# Patient Record
Sex: FEMALE | Race: WHITE | NOT HISPANIC OR LATINO | Employment: OTHER | URBAN - METROPOLITAN AREA
[De-identification: names, ages, dates, MRNs, and addresses within clinical notes are randomized per-mention and may not be internally consistent; named-entity substitution may affect disease eponyms.]

---

## 2023-03-09 ENCOUNTER — OFFICE VISIT (OUTPATIENT)
Dept: INTERNAL MEDICINE | Facility: CLINIC | Age: 63
End: 2023-03-09
Payer: COMMERCIAL

## 2023-03-09 VITALS
WEIGHT: 158 LBS | HEART RATE: 62 BPM | DIASTOLIC BLOOD PRESSURE: 64 MMHG | TEMPERATURE: 98 F | SYSTOLIC BLOOD PRESSURE: 110 MMHG | RESPIRATION RATE: 14 BRPM | HEIGHT: 66 IN | BODY MASS INDEX: 25.39 KG/M2 | OXYGEN SATURATION: 98 %

## 2023-03-09 DIAGNOSIS — Z01.419 WELL WOMAN EXAM: ICD-10-CM

## 2023-03-09 DIAGNOSIS — Z13.6 ENCOUNTER FOR SCREENING FOR CARDIOVASCULAR DISORDERS: ICD-10-CM

## 2023-03-09 DIAGNOSIS — E55.9 VITAMIN D DEFICIENCY: ICD-10-CM

## 2023-03-09 DIAGNOSIS — R21 SKIN RASH: ICD-10-CM

## 2023-03-09 DIAGNOSIS — Z12.31 VISIT FOR SCREENING MAMMOGRAM: ICD-10-CM

## 2023-03-09 DIAGNOSIS — Z00.00 WELLNESS EXAMINATION: Primary | ICD-10-CM

## 2023-03-09 PROCEDURE — 1160F RVW MEDS BY RX/DR IN RCRD: CPT | Mod: CPTII,,, | Performed by: INTERNAL MEDICINE

## 2023-03-09 PROCEDURE — 3074F PR MOST RECENT SYSTOLIC BLOOD PRESSURE < 130 MM HG: ICD-10-PCS | Mod: CPTII,,, | Performed by: INTERNAL MEDICINE

## 2023-03-09 PROCEDURE — 99386 PREV VISIT NEW AGE 40-64: CPT | Mod: ,,, | Performed by: INTERNAL MEDICINE

## 2023-03-09 PROCEDURE — 3074F SYST BP LT 130 MM HG: CPT | Mod: CPTII,,, | Performed by: INTERNAL MEDICINE

## 2023-03-09 PROCEDURE — 3008F PR BODY MASS INDEX (BMI) DOCUMENTED: ICD-10-PCS | Mod: CPTII,,, | Performed by: INTERNAL MEDICINE

## 2023-03-09 PROCEDURE — 1159F MED LIST DOCD IN RCRD: CPT | Mod: CPTII,,, | Performed by: INTERNAL MEDICINE

## 2023-03-09 PROCEDURE — 1160F PR REVIEW ALL MEDS BY PRESCRIBER/CLIN PHARMACIST DOCUMENTED: ICD-10-PCS | Mod: CPTII,,, | Performed by: INTERNAL MEDICINE

## 2023-03-09 PROCEDURE — 3078F DIAST BP <80 MM HG: CPT | Mod: CPTII,,, | Performed by: INTERNAL MEDICINE

## 2023-03-09 PROCEDURE — 3078F PR MOST RECENT DIASTOLIC BLOOD PRESSURE < 80 MM HG: ICD-10-PCS | Mod: CPTII,,, | Performed by: INTERNAL MEDICINE

## 2023-03-09 PROCEDURE — 36415 COLL VENOUS BLD VENIPUNCTURE: CPT | Mod: ,,, | Performed by: INTERNAL MEDICINE

## 2023-03-09 PROCEDURE — 36415 PR COLLECTION VENOUS BLOOD,VENIPUNCTURE: ICD-10-PCS | Mod: ,,, | Performed by: INTERNAL MEDICINE

## 2023-03-09 PROCEDURE — 99386 PR PREVENTIVE VISIT,NEW,40-64: ICD-10-PCS | Mod: ,,, | Performed by: INTERNAL MEDICINE

## 2023-03-09 PROCEDURE — 3008F BODY MASS INDEX DOCD: CPT | Mod: CPTII,,, | Performed by: INTERNAL MEDICINE

## 2023-03-09 PROCEDURE — 1159F PR MEDICATION LIST DOCUMENTED IN MEDICAL RECORD: ICD-10-PCS | Mod: CPTII,,, | Performed by: INTERNAL MEDICINE

## 2023-03-09 RX ORDER — CHOLECALCIFEROL (VITAMIN D3) 25 MCG
TABLET ORAL DAILY
COMMUNITY

## 2023-03-09 RX ORDER — BUTYROSPERMUM PARKII(SHEA BUTTER), SIMMONDSIA CHINENSIS (JOJOBA) SEED OIL, ALOE BARBADENSIS LEAF EXTRACT .01; 1; 3.5 G/100G; G/100G; G/100G
LIQUID TOPICAL
COMMUNITY

## 2023-03-09 RX ORDER — AMOXICILLIN 500 MG
CAPSULE ORAL DAILY
COMMUNITY

## 2023-03-09 NOTE — PROGRESS NOTES
"Subjective:       Patient ID: Shira Ying is a 62 y.o. female.    Chief Complaint: [facility] [place] [facility]    62-year-old white female is here for initial visit to [facility] and for wellness exam.  She recently moved here from [place] with her .  She is not on any prescription medication.  She has a history of chronic constipation and mild anxiety, but has never been on anxiety medication.  She denies chest pain or shortness of breath.  Review of systems otherwise negative.    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  Her  is here today as a new patient.  She has 2 children from a previous marriage.  Her brother-in-law is Malcolm Davison, and they live in [place].  She enjoys hiking    Review of Systems   Constitutional:  Negative for fever.   HENT:  Negative for nosebleeds.    Eyes:  Negative for visual disturbance.   Respiratory:  Negative for shortness of breath.    Cardiovascular:  Negative for chest pain.   Gastrointestinal:  Negative for abdominal pain.   Genitourinary:  Negative for dysuria.   Musculoskeletal:  Negative for gait problem.   Neurological:  Negative for headaches.       Objective:      Physical Exam  HENT:      Head: Normocephalic.      Mouth/Throat:      Pharynx: Oropharynx is clear.   Eyes:      Extraocular Movements: Extraocular movements intact.   Cardiovascular:      Rate and Rhythm: Normal rate and regular rhythm.   Pulmonary:      Breath sounds: Normal breath sounds.   Abdominal:      Palpations: Abdomen is soft.   Musculoskeletal:         General: No swelling.   Skin:     General: Skin is warm.   Neurological:      General: No focal deficit present.      Mental Status: She is alert and oriented to person, place, and time.   Psychiatric:         Mood and Affect: Mood normal.       Vitals:    03/09/23 0939   BP: 110/64   Pulse: 62   Resp: 14   Temp: 97.9 °F (36.6 °C)   SpO2: 98%   Weight: 71.7 kg (158 lb)   Height: 5' 6" (1.676 m)      Assessment:     "   Problem List Items Addressed This Visit          Endocrine    Vitamin D deficiency       Other    Wellness examination - Primary     Other Visit Diagnoses       Visit for screening mammogram        Relevant Orders    Mammo Digital Screening Bilat w/ Rob              Medication List with Changes/Refills   Current Medications    LACTOBACILLUS RHAMNOSUS GG (CULTURELLE) 10 BILLION CELL CAPSULE    Take 1 capsule by mouth once daily.    MAGNESIUM CITRATE 100 MG CAP    Take by mouth.    MULTIVITAMIN WITH MINERALS TABLET    Take 1 tablet by mouth once daily.    OMEGA-3 FATTY ACIDS/FISH OIL (FISH OIL-OMEGA-3 FATTY ACIDS) 300-1,000 MG CAPSULE    Take by mouth once daily.    VITAMIN D (VITAMIN D3) 1000 UNITS TAB    Take by mouth once daily.        Plan:       Chronic constipation:  Continue magnesium  Vitamin-D deficiency: Continue supplement  Wellness:  Check fasting labs today, schedule calcium score, schedule mammogram and bone density.  Get records from last colonoscopy, which was in the past 3 years she says.

## 2023-03-21 ENCOUNTER — HOSPITAL ENCOUNTER (OUTPATIENT)
Dept: RADIOLOGY | Facility: HOSPITAL | Age: 63
Discharge: HOME | End: 2023-03-21
Attending: INTERNAL MEDICINE
Payer: COMMERCIAL

## 2023-03-21 DIAGNOSIS — Z13.6 ENCOUNTER FOR SCREENING FOR CARDIOVASCULAR DISORDERS: ICD-10-CM

## 2023-03-21 DIAGNOSIS — Z12.31 VISIT FOR SCREENING MAMMOGRAM: ICD-10-CM

## 2023-03-21 DIAGNOSIS — Z00.00 WELLNESS EXAMINATION: ICD-10-CM

## 2023-03-21 PROCEDURE — 77067 SCR MAMMO BI INCL CAD: CPT | Mod: TC

## 2023-03-21 PROCEDURE — 75571 CT HRT W/O DYE W/CA TEST: CPT | Mod: TC

## 2023-03-21 PROCEDURE — 77063 MAMMO DIGITAL SCREENING BILAT WITH TOMO: ICD-10-PCS | Mod: 26,,, | Performed by: RADIOLOGY

## 2023-03-21 PROCEDURE — 77080 DXA BONE DENSITY AXIAL: CPT | Mod: 26,,, | Performed by: RADIOLOGY

## 2023-03-21 PROCEDURE — 77080 DXA BONE DENSITY AXIAL SKELETON 1 OR MORE SITES: ICD-10-PCS | Mod: 26,,, | Performed by: RADIOLOGY

## 2023-03-21 PROCEDURE — 77067 MAMMO DIGITAL SCREENING BILAT WITH TOMO: ICD-10-PCS | Mod: 26,,, | Performed by: RADIOLOGY

## 2023-03-21 PROCEDURE — 77080 DXA BONE DENSITY AXIAL: CPT | Mod: TC

## 2023-03-21 PROCEDURE — 77063 BREAST TOMOSYNTHESIS BI: CPT | Mod: 26,,, | Performed by: RADIOLOGY

## 2023-03-21 PROCEDURE — 77067 SCR MAMMO BI INCL CAD: CPT | Mod: 26,,, | Performed by: RADIOLOGY

## 2023-03-22 ENCOUNTER — PATIENT MESSAGE (OUTPATIENT)
Dept: INTERNAL MEDICINE | Facility: CLINIC | Age: 63
End: 2023-03-22
Payer: COMMERCIAL

## 2023-05-23 ENCOUNTER — PATIENT MESSAGE (OUTPATIENT)
Dept: INTERNAL MEDICINE | Facility: CLINIC | Age: 63
End: 2023-05-23
Payer: COMMERCIAL

## 2023-06-07 ENCOUNTER — PATIENT MESSAGE (OUTPATIENT)
Dept: INTERNAL MEDICINE | Facility: CLINIC | Age: 63
End: 2023-06-07
Payer: COMMERCIAL

## 2023-06-08 ENCOUNTER — TELEPHONE (OUTPATIENT)
Dept: INTERNAL MEDICINE | Facility: CLINIC | Age: 63
End: 2023-06-08
Payer: COMMERCIAL

## 2023-06-12 PROBLEM — Z00.00 WELLNESS EXAMINATION: Status: RESOLVED | Noted: 2023-03-09 | Resolved: 2023-06-12

## 2023-06-14 ENCOUNTER — ANESTHESIA (OUTPATIENT)
Dept: SURGERY | Facility: HOSPITAL | Age: 63
End: 2023-06-14
Payer: COMMERCIAL

## 2023-06-14 ENCOUNTER — ANESTHESIA EVENT (OUTPATIENT)
Dept: SURGERY | Facility: HOSPITAL | Age: 63
End: 2023-06-14
Payer: COMMERCIAL

## 2023-06-14 ENCOUNTER — HOSPITAL ENCOUNTER (EMERGENCY)
Facility: HOSPITAL | Age: 63
Discharge: HOME | End: 2023-06-14
Attending: EMERGENCY MEDICINE | Admitting: SURGERY
Payer: COMMERCIAL

## 2023-06-14 VITALS
OXYGEN SATURATION: 94 % | WEIGHT: 158 LBS | HEART RATE: 69 BPM | TEMPERATURE: 98 F | HEIGHT: 66 IN | RESPIRATION RATE: 14 BRPM | DIASTOLIC BLOOD PRESSURE: 78 MMHG | SYSTOLIC BLOOD PRESSURE: 133 MMHG | BODY MASS INDEX: 25.39 KG/M2

## 2023-06-14 DIAGNOSIS — K37 APPENDICITIS, UNSPECIFIED APPENDICITIS TYPE: Primary | ICD-10-CM

## 2023-06-14 LAB
ALBUMIN SERPL-MCNC: 4.2 G/DL (ref 3.4–4.8)
ALBUMIN/GLOB SERPL: 1.4 RATIO (ref 1.1–2)
ALP SERPL-CCNC: 62 UNIT/L (ref 40–150)
ALT SERPL-CCNC: 20 UNIT/L (ref 0–55)
APPEARANCE UR: CLEAR
AST SERPL-CCNC: 25 UNIT/L (ref 5–34)
BACTERIA #/AREA URNS AUTO: NORMAL /HPF
BASOPHILS # BLD AUTO: 0.03 X10(3)/MCL
BASOPHILS NFR BLD AUTO: 0.2 %
BILIRUB UR QL STRIP.AUTO: NEGATIVE MG/DL
BILIRUBIN DIRECT+TOT PNL SERPL-MCNC: 1.7 MG/DL
BUN SERPL-MCNC: 13.6 MG/DL (ref 9.8–20.1)
CALCIUM SERPL-MCNC: 9.6 MG/DL (ref 8.4–10.2)
CHLORIDE SERPL-SCNC: 107 MMOL/L (ref 98–107)
CO2 SERPL-SCNC: 26 MMOL/L (ref 23–31)
COLOR UR: YELLOW
CREAT SERPL-MCNC: 0.98 MG/DL (ref 0.55–1.02)
EOSINOPHIL # BLD AUTO: 0.01 X10(3)/MCL (ref 0–0.9)
EOSINOPHIL NFR BLD AUTO: 0.1 %
ERYTHROCYTE [DISTWIDTH] IN BLOOD BY AUTOMATED COUNT: 13.5 % (ref 11.5–17)
GFR SERPLBLD CREATININE-BSD FMLA CKD-EPI: >60 MLS/MIN/1.73/M2
GLOBULIN SER-MCNC: 3 GM/DL (ref 2.4–3.5)
GLUCOSE SERPL-MCNC: 111 MG/DL (ref 82–115)
GLUCOSE UR QL STRIP.AUTO: NEGATIVE MG/DL
HCT VFR BLD AUTO: 42.1 % (ref 37–47)
HGB BLD-MCNC: 14 G/DL (ref 12–16)
IMM GRANULOCYTES # BLD AUTO: 0.06 X10(3)/MCL (ref 0–0.04)
IMM GRANULOCYTES NFR BLD AUTO: 0.4 %
KETONES UR QL STRIP.AUTO: NEGATIVE MG/DL
LEUKOCYTE ESTERASE UR QL STRIP.AUTO: NEGATIVE UNIT/L
LYMPHOCYTES # BLD AUTO: 1.11 X10(3)/MCL (ref 0.6–4.6)
LYMPHOCYTES NFR BLD AUTO: 7.2 %
MCH RBC QN AUTO: 30.6 PG (ref 27–31)
MCHC RBC AUTO-ENTMCNC: 33.3 G/DL (ref 33–36)
MCV RBC AUTO: 91.9 FL (ref 80–94)
MONOCYTES # BLD AUTO: 0.97 X10(3)/MCL (ref 0.1–1.3)
MONOCYTES NFR BLD AUTO: 6.3 %
NEUTROPHILS # BLD AUTO: 13.14 X10(3)/MCL (ref 2.1–9.2)
NEUTROPHILS NFR BLD AUTO: 85.8 %
NITRITE UR QL STRIP.AUTO: NEGATIVE
NRBC BLD AUTO-RTO: 0 %
PH UR STRIP.AUTO: 5.5 [PH]
PLATELET # BLD AUTO: 291 X10(3)/MCL (ref 130–400)
PMV BLD AUTO: 10.7 FL (ref 7.4–10.4)
POTASSIUM SERPL-SCNC: 4.4 MMOL/L (ref 3.5–5.1)
PROT SERPL-MCNC: 7.2 GM/DL (ref 5.8–7.6)
PROT UR QL STRIP.AUTO: NEGATIVE MG/DL
RBC # BLD AUTO: 4.58 X10(6)/MCL (ref 4.2–5.4)
RBC #/AREA URNS AUTO: 5 /HPF
RBC UR QL AUTO: ABNORMAL UNIT/L
SODIUM SERPL-SCNC: 140 MMOL/L (ref 136–145)
SP GR UR STRIP.AUTO: 1.02 (ref 1–1.03)
SQUAMOUS #/AREA URNS AUTO: <5 /HPF
UROBILINOGEN UR STRIP-ACNC: 0.2 MG/DL
WBC # SPEC AUTO: 15.32 X10(3)/MCL (ref 4.5–11.5)
WBC #/AREA URNS AUTO: <5 /HPF

## 2023-06-14 PROCEDURE — 71000015 HC POSTOP RECOV 1ST HR: Performed by: SURGERY

## 2023-06-14 PROCEDURE — 96375 TX/PRO/DX INJ NEW DRUG ADDON: CPT

## 2023-06-14 PROCEDURE — 25000003 PHARM REV CODE 250: Performed by: SURGERY

## 2023-06-14 PROCEDURE — 63600175 PHARM REV CODE 636 W HCPCS: Performed by: EMERGENCY MEDICINE

## 2023-06-14 PROCEDURE — 99285 EMERGENCY DEPT VISIT HI MDM: CPT | Mod: 25

## 2023-06-14 PROCEDURE — 25000003 PHARM REV CODE 250

## 2023-06-14 PROCEDURE — 36000709 HC OR TIME LEV III EA ADD 15 MIN: Performed by: SURGERY

## 2023-06-14 PROCEDURE — 25000003 PHARM REV CODE 250: Performed by: NURSE ANESTHETIST, CERTIFIED REGISTERED

## 2023-06-14 PROCEDURE — D9220A PRA ANESTHESIA: Mod: ANES,,, | Performed by: ANESTHESIOLOGY

## 2023-06-14 PROCEDURE — 63600175 PHARM REV CODE 636 W HCPCS: Performed by: NURSE ANESTHETIST, CERTIFIED REGISTERED

## 2023-06-14 PROCEDURE — 81001 URINALYSIS AUTO W/SCOPE: CPT | Performed by: EMERGENCY MEDICINE

## 2023-06-14 PROCEDURE — 71000033 HC RECOVERY, INTIAL HOUR: Performed by: SURGERY

## 2023-06-14 PROCEDURE — D9220A PRA ANESTHESIA: ICD-10-PCS | Mod: CRNA,,, | Performed by: NURSE ANESTHETIST, CERTIFIED REGISTERED

## 2023-06-14 PROCEDURE — D9220A PRA ANESTHESIA: ICD-10-PCS | Mod: ANES,,, | Performed by: ANESTHESIOLOGY

## 2023-06-14 PROCEDURE — 37000009 HC ANESTHESIA EA ADD 15 MINS: Performed by: SURGERY

## 2023-06-14 PROCEDURE — 96372 THER/PROPH/DIAG INJ SC/IM: CPT | Mod: 59 | Performed by: STUDENT IN AN ORGANIZED HEALTH CARE EDUCATION/TRAINING PROGRAM

## 2023-06-14 PROCEDURE — 25500020 PHARM REV CODE 255: Performed by: EMERGENCY MEDICINE

## 2023-06-14 PROCEDURE — 63600175 PHARM REV CODE 636 W HCPCS: Performed by: STUDENT IN AN ORGANIZED HEALTH CARE EDUCATION/TRAINING PROGRAM

## 2023-06-14 PROCEDURE — 71000016 HC POSTOP RECOV ADDL HR: Performed by: SURGERY

## 2023-06-14 PROCEDURE — 37000008 HC ANESTHESIA 1ST 15 MINUTES: Performed by: SURGERY

## 2023-06-14 PROCEDURE — 27201423 OPTIME MED/SURG SUP & DEVICES STERILE SUPPLY: Performed by: SURGERY

## 2023-06-14 PROCEDURE — 96361 HYDRATE IV INFUSION ADD-ON: CPT | Mod: 59

## 2023-06-14 PROCEDURE — 80053 COMPREHEN METABOLIC PANEL: CPT | Performed by: EMERGENCY MEDICINE

## 2023-06-14 PROCEDURE — 85025 COMPLETE CBC W/AUTO DIFF WBC: CPT | Performed by: EMERGENCY MEDICINE

## 2023-06-14 PROCEDURE — 36000708 HC OR TIME LEV III 1ST 15 MIN: Performed by: SURGERY

## 2023-06-14 PROCEDURE — 96374 THER/PROPH/DIAG INJ IV PUSH: CPT

## 2023-06-14 PROCEDURE — 71000039 HC RECOVERY, EACH ADD'L HOUR: Performed by: SURGERY

## 2023-06-14 PROCEDURE — 25000003 PHARM REV CODE 250: Performed by: EMERGENCY MEDICINE

## 2023-06-14 PROCEDURE — D9220A PRA ANESTHESIA: Mod: CRNA,,, | Performed by: NURSE ANESTHETIST, CERTIFIED REGISTERED

## 2023-06-14 RX ORDER — SODIUM CHLORIDE 0.9 % (FLUSH) 0.9 %
10 SYRINGE (ML) INJECTION
Status: DISCONTINUED | OUTPATIENT
Start: 2023-06-14 | End: 2023-06-14 | Stop reason: HOSPADM

## 2023-06-14 RX ORDER — PROPOFOL 10 MG/ML
VIAL (ML) INTRAVENOUS
Status: DISCONTINUED | OUTPATIENT
Start: 2023-06-14 | End: 2023-06-14

## 2023-06-14 RX ORDER — TALC
6 POWDER (GRAM) TOPICAL NIGHTLY PRN
Status: DISCONTINUED | OUTPATIENT
Start: 2023-06-14 | End: 2023-06-14 | Stop reason: HOSPADM

## 2023-06-14 RX ORDER — AMOXICILLIN AND CLAVULANATE POTASSIUM 875; 125 MG/1; MG/1
1 TABLET, FILM COATED ORAL EVERY 12 HOURS
Qty: 8 TABLET | Refills: 0 | Status: SHIPPED | OUTPATIENT
Start: 2023-06-14 | End: 2023-06-18

## 2023-06-14 RX ORDER — ACETAMINOPHEN 325 MG/1
650 TABLET ORAL EVERY 4 HOURS PRN
Status: DISCONTINUED | OUTPATIENT
Start: 2023-06-14 | End: 2023-06-14 | Stop reason: HOSPADM

## 2023-06-14 RX ORDER — LIDOCAINE HYDROCHLORIDE 20 MG/ML
INJECTION, SOLUTION EPIDURAL; INFILTRATION; INTRACAUDAL; PERINEURAL
Status: DISCONTINUED | OUTPATIENT
Start: 2023-06-14 | End: 2023-06-14

## 2023-06-14 RX ORDER — MORPHINE SULFATE 4 MG/ML
4 INJECTION, SOLUTION INTRAMUSCULAR; INTRAVENOUS
Status: COMPLETED | OUTPATIENT
Start: 2023-06-14 | End: 2023-06-14

## 2023-06-14 RX ORDER — OXYCODONE HYDROCHLORIDE 5 MG/1
10 TABLET ORAL EVERY 4 HOURS PRN
Status: DISCONTINUED | OUTPATIENT
Start: 2023-06-14 | End: 2023-06-14 | Stop reason: HOSPADM

## 2023-06-14 RX ORDER — KETOROLAC TROMETHAMINE 30 MG/ML
INJECTION, SOLUTION INTRAMUSCULAR; INTRAVENOUS
Status: DISCONTINUED | OUTPATIENT
Start: 2023-06-14 | End: 2023-06-14

## 2023-06-14 RX ORDER — ONDANSETRON 4 MG/1
8 TABLET, ORALLY DISINTEGRATING ORAL EVERY 8 HOURS PRN
Status: DISCONTINUED | OUTPATIENT
Start: 2023-06-14 | End: 2023-06-14 | Stop reason: HOSPADM

## 2023-06-14 RX ORDER — OXYCODONE HYDROCHLORIDE 5 MG/1
5 TABLET ORAL EVERY 4 HOURS PRN
Qty: 8 TABLET | Refills: 0 | Status: SHIPPED | OUTPATIENT
Start: 2023-06-14 | End: 2023-07-10

## 2023-06-14 RX ORDER — LIDOCAINE HYDROCHLORIDE 10 MG/ML
1 INJECTION, SOLUTION EPIDURAL; INFILTRATION; INTRACAUDAL; PERINEURAL ONCE AS NEEDED
Status: DISCONTINUED | OUTPATIENT
Start: 2023-06-14 | End: 2023-06-14 | Stop reason: HOSPADM

## 2023-06-14 RX ORDER — ONDANSETRON 2 MG/ML
INJECTION INTRAMUSCULAR; INTRAVENOUS
Status: DISCONTINUED | OUTPATIENT
Start: 2023-06-14 | End: 2023-06-14

## 2023-06-14 RX ORDER — ROCURONIUM BROMIDE 10 MG/ML
INJECTION, SOLUTION INTRAVENOUS
Status: DISCONTINUED | OUTPATIENT
Start: 2023-06-14 | End: 2023-06-14

## 2023-06-14 RX ORDER — LIDOCAINE HYDROCHLORIDE AND EPINEPHRINE 5; 5 MG/ML; UG/ML
INJECTION, SOLUTION INFILTRATION; PERINEURAL
Status: DISCONTINUED | OUTPATIENT
Start: 2023-06-14 | End: 2023-06-14 | Stop reason: HOSPADM

## 2023-06-14 RX ORDER — OXYCODONE HYDROCHLORIDE 5 MG/1
5 TABLET ORAL EVERY 4 HOURS PRN
Status: DISCONTINUED | OUTPATIENT
Start: 2023-06-14 | End: 2023-06-14 | Stop reason: HOSPADM

## 2023-06-14 RX ORDER — ACETAMINOPHEN 325 MG/1
650 TABLET ORAL EVERY 8 HOURS PRN
Status: DISCONTINUED | OUTPATIENT
Start: 2023-06-14 | End: 2023-06-14

## 2023-06-14 RX ORDER — ONDANSETRON 2 MG/ML
4 INJECTION INTRAMUSCULAR; INTRAVENOUS
Status: COMPLETED | OUTPATIENT
Start: 2023-06-14 | End: 2023-06-14

## 2023-06-14 RX ORDER — EPHEDRINE SULFATE 50 MG/ML
INJECTION, SOLUTION INTRAVENOUS
Status: DISCONTINUED | OUTPATIENT
Start: 2023-06-14 | End: 2023-06-14

## 2023-06-14 RX ORDER — DEXAMETHASONE SODIUM PHOSPHATE 4 MG/ML
INJECTION, SOLUTION INTRA-ARTICULAR; INTRALESIONAL; INTRAMUSCULAR; INTRAVENOUS; SOFT TISSUE
Status: DISCONTINUED | OUTPATIENT
Start: 2023-06-14 | End: 2023-06-14

## 2023-06-14 RX ORDER — FENTANYL CITRATE 50 UG/ML
INJECTION, SOLUTION INTRAMUSCULAR; INTRAVENOUS
Status: DISCONTINUED | OUTPATIENT
Start: 2023-06-14 | End: 2023-06-14

## 2023-06-14 RX ORDER — SODIUM CHLORIDE, SODIUM LACTATE, POTASSIUM CHLORIDE, CALCIUM CHLORIDE 600; 310; 30; 20 MG/100ML; MG/100ML; MG/100ML; MG/100ML
INJECTION, SOLUTION INTRAVENOUS CONTINUOUS
Status: DISCONTINUED | OUTPATIENT
Start: 2023-06-14 | End: 2023-06-14 | Stop reason: HOSPADM

## 2023-06-14 RX ORDER — HEPARIN SODIUM 5000 [USP'U]/ML
5000 INJECTION, SOLUTION INTRAVENOUS; SUBCUTANEOUS ONCE
Status: COMPLETED | OUTPATIENT
Start: 2023-06-14 | End: 2023-06-14

## 2023-06-14 RX ORDER — MIDAZOLAM HYDROCHLORIDE 1 MG/ML
INJECTION INTRAMUSCULAR; INTRAVENOUS
Status: DISCONTINUED | OUTPATIENT
Start: 2023-06-14 | End: 2023-06-14

## 2023-06-14 RX ORDER — ACETAMINOPHEN 10 MG/ML
INJECTION, SOLUTION INTRAVENOUS
Status: DISCONTINUED | OUTPATIENT
Start: 2023-06-14 | End: 2023-06-14

## 2023-06-14 RX ADMIN — ROCURONIUM BROMIDE 20 MG: 10 SOLUTION INTRAVENOUS at 01:06

## 2023-06-14 RX ADMIN — SODIUM CHLORIDE, POTASSIUM CHLORIDE, SODIUM LACTATE AND CALCIUM CHLORIDE 1000 ML: 600; 310; 30; 20 INJECTION, SOLUTION INTRAVENOUS at 08:06

## 2023-06-14 RX ADMIN — ACETAMINOPHEN 1000 MG: 10 INJECTION, SOLUTION INTRAVENOUS at 01:06

## 2023-06-14 RX ADMIN — IOPAMIDOL 100 ML: 755 INJECTION, SOLUTION INTRAVENOUS at 09:06

## 2023-06-14 RX ADMIN — LIDOCAINE HYDROCHLORIDE 40 ML: 20 INJECTION, SOLUTION EPIDURAL; INFILTRATION; INTRACAUDAL; PERINEURAL at 01:06

## 2023-06-14 RX ADMIN — FENTANYL CITRATE 50 MCG: 50 INJECTION, SOLUTION INTRAMUSCULAR; INTRAVENOUS at 01:06

## 2023-06-14 RX ADMIN — EPHEDRINE SULFATE 10 MG: 50 INJECTION INTRAVENOUS at 01:06

## 2023-06-14 RX ADMIN — SUGAMMADEX 200 MG: 100 INJECTION, SOLUTION INTRAVENOUS at 01:06

## 2023-06-14 RX ADMIN — HEPARIN SODIUM 5000 UNITS: 5000 INJECTION, SOLUTION INTRAVENOUS; SUBCUTANEOUS at 02:06

## 2023-06-14 RX ADMIN — ROCURONIUM BROMIDE 40 MG: 10 SOLUTION INTRAVENOUS at 01:06

## 2023-06-14 RX ADMIN — PIPERACILLIN AND TAZOBACTAM 4.5 G: 4; .5 INJECTION, POWDER, LYOPHILIZED, FOR SOLUTION INTRAVENOUS; PARENTERAL at 10:06

## 2023-06-14 RX ADMIN — ONDANSETRON 4 MG: 2 INJECTION INTRAMUSCULAR; INTRAVENOUS at 08:06

## 2023-06-14 RX ADMIN — ONDANSETRON 4 MG: 2 INJECTION INTRAMUSCULAR; INTRAVENOUS at 01:06

## 2023-06-14 RX ADMIN — PROPOFOL 150 MG: 10 INJECTION, EMULSION INTRAVENOUS at 01:06

## 2023-06-14 RX ADMIN — SODIUM CHLORIDE, SODIUM GLUCONATE, SODIUM ACETATE, POTASSIUM CHLORIDE AND MAGNESIUM CHLORIDE: 526; 502; 368; 37; 30 INJECTION, SOLUTION INTRAVENOUS at 01:06

## 2023-06-14 RX ADMIN — MIDAZOLAM HYDROCHLORIDE 2 MG: 1 INJECTION, SOLUTION INTRAMUSCULAR; INTRAVENOUS at 12:06

## 2023-06-14 RX ADMIN — MORPHINE SULFATE 4 MG: 4 INJECTION INTRAVENOUS at 08:06

## 2023-06-14 RX ADMIN — ACETAMINOPHEN 650 MG: 325 TABLET ORAL at 10:06

## 2023-06-14 RX ADMIN — KETOROLAC TROMETHAMINE 30 MG: 30 INJECTION, SOLUTION INTRAMUSCULAR; INTRAVENOUS at 01:06

## 2023-06-14 RX ADMIN — DEXAMETHASONE SODIUM PHOSPHATE 4 MG: 4 INJECTION, SOLUTION INTRA-ARTICULAR; INTRALESIONAL; INTRAMUSCULAR; INTRAVENOUS; SOFT TISSUE at 01:06

## 2023-06-14 RX ADMIN — OXYCODONE HYDROCHLORIDE 10 MG: 10 TABLET ORAL at 10:06

## 2023-06-14 RX ADMIN — ROCURONIUM BROMIDE 10 MG: 10 SOLUTION INTRAVENOUS at 01:06

## 2023-06-14 RX ADMIN — SODIUM CHLORIDE, SODIUM GLUCONATE, SODIUM ACETATE, POTASSIUM CHLORIDE AND MAGNESIUM CHLORIDE: 526; 502; 368; 37; 30 INJECTION, SOLUTION INTRAVENOUS at 12:06

## 2023-06-14 NOTE — TRANSFER OF CARE
"Anesthesia Transfer of Care Note    Patient: Shira Ying    Procedure(s) Performed: Procedure(s) (LRB):  APPENDECTOMY, LAPAROSCOPIC (N/A)    Patient location: PACU    Anesthesia Type: general    Transport from OR: Transported from OR on room air with adequate spontaneous ventilation    Post pain: adequate analgesia    Post assessment: no apparent anesthetic complications and tolerated procedure well    Post vital signs: stable    Level of consciousness: sedated    Nausea/Vomiting: no nausea/vomiting    Complications: none    Transfer of care protocol was followed      Last vitals:   Visit Vitals  BP (!) 143/69   Pulse 74   Temp 36.8 °C (98.2 °F) (Oral)   Resp (!) 22   Ht 5' 6" (1.676 m)   Wt 71.7 kg (158 lb)   SpO2 96%   BMI 25.50 kg/m²     "

## 2023-06-14 NOTE — DISCHARGE SUMMARY
DISCHARGE SUMMARY    Admit Date: 6/14/2023  Discharge Date: 6/14/2023  Admitting Physician: Tommy Mesa MD  Consulting Physicians(s): none    Admission HPI:   63 y/o F with no PMHx presenting to ED today with 2 days of abdominal pain that has progressively gotten worse. She states that she has associated nausea but no vomiting. Had a nonbloody BM this morning but states that she has felt more constipated over the last few days. Denies fevers, chills, chest pain, shortness of breath. Patient afebrile and hemodynamically stable at this time, WBC 15. CT abdomen pelvis concerning for acute non-perforated appendicitis with no abscess.    Hospital Course:   Pt was admitted on 6/14 with acute appendicitis. She was taken to the OR for a lap appendectomy which was completed without complication. Pt tolerated the procedure well and postoperatively, she was tolerating a regular diet, voiding, and pain was appropriately controlled with oral pain meds. She was deemed stable for discharge home at this time.    Procedures Performed:   Laparoscopic appendectomy    Discharge Condition: good    Disposition: Home or Self Care    Follow-Up Plan:   general surgery clinic     Discharge Instructions:   Activity: as tolerated, no heavy lifting for 6 weeks  Diet: regular  Wound Care: keep incisions clean and dry, no bathing or soaking incisions for 6 weeks. May shower after 24 hours.    Discharge Medications:     Medication List        START taking these medications      oxyCODONE 5 MG immediate release tablet  Commonly known as: ROXICODONE  Take 1 tablet (5 mg total) by mouth every 4 (four) hours as needed for Pain.            CONTINUE taking these medications      fish oil-omega-3 fatty acids 300-1,000 mg capsule     Lactobacillus rhamnosus GG 10 billion cell capsule  Commonly known as: CULTURELLE     magnesium citrate 100 mg Cap     multivitamin with minerals tablet     vitamin D 1000 units Tab  Commonly known as: VITAMIN D3                Where to Get Your Medications        You can get these medications from any pharmacy    Bring a paper prescription for each of these medications  oxyCODONE 5 MG immediate release tablet          Elise Lawson MD   [employer] General Surgery, PGY-1

## 2023-06-14 NOTE — ANESTHESIA PREPROCEDURE EVALUATION
06/14/2023  Shira Ying is a 62 y.o., female.    with 2 days of abdominal pain that has progressively gotten worse. She states that she has associated nausea but no vomiting. Had a nonbloody BM this morning but states that she has felt more constipated over the last few days. Denies fevers, chills, chest pain, shortness of breath. Patient afebrile and hemodynamically stable at this time, WBC 15. CT abdomen pelvis concerning for acute non-perforated appendicitis with no abscess.    Pre-op Assessment    I have reviewed the Patient Summary Reports.     I have reviewed the Nursing Notes. I have reviewed the NPO Status.   I have reviewed the Medications.     Review of Systems  Anesthesia Hx:  No problems with previous Anesthesia        Physical Exam  General: Well nourished, Cooperative, Alert and Oriented    Airway:  Mallampati: II   Mouth Opening: Normal  TM Distance: Normal  Tongue: Normal  Neck ROM: Normal ROM        Anesthesia Plan  Type of Anesthesia, risks & benefits discussed:    Anesthesia Type: Gen ETT  Intra-op Monitoring Plan: Standard ASA Monitors  Post Op Pain Control Plan: multimodal analgesia  Induction:  IV  Airway Plan: Direct, Post-Induction  Informed Consent: Informed consent signed with the Patient and all parties understand the risks and agree with anesthesia plan.  All questions answered. Patient consented to blood products? Yes  ASA Score: 1 Emergent  Day of Surgery Review of History & Physical: H&P Update referred to the surgeon/provider.    Ready For Surgery From Anesthesia Perspective.     .

## 2023-06-14 NOTE — ANESTHESIA PROCEDURE NOTES
Intubation    Date/Time: 6/14/2023 1:04 PM  Performed by: Joey Gerhardt, CRNA  Authorized by: Drew Beavers MD     Intubation:     Induction:  Intravenous    Intubated:  Postinduction    Mask Ventilation:  Easy mask    Attempts:  1    Attempted By:  CRNA    Method of Intubation:  Direct    Blade:  Jose Maria 3    Laryngeal View Grade: Grade I - full view of cords      Difficult Airway Encountered?: No      Complications:  None    Airway Device:  Oral endotracheal tube    Airway Device Size:  7.0    Style/Cuff Inflation:  Cuffed    Inflation Amount (mL):  7    Tube secured:  22    Secured at:  The lips    Placement Verified By:  Capnometry    Complicating Factors:  None    Findings Post-Intubation:  BS equal bilateral

## 2023-06-14 NOTE — ED PROVIDER NOTES
Encounter Date: 6/14/2023    SCRIBE #1 NOTE: I, Fortunato Haley, am scribing for, and in the presence of,  Reinaldo Ruby MD. I have scribed the following portions of the note - Other sections scribed: HPI,ROS,PE.     History     Chief Complaint   Patient presents with    Abdominal Pain     RLQ pain that radiates to R flank x2 days. Reports headache, chills throughout the night. Denies fever at home. Denies urinary problems. +nausea, denies vomiting. Decreased appetite.      63 y/o female with past surgical history of oophorectomy and fissurectomy presents to ED c/o right lower quadrant abdominal pain onset 2x days. Pt states pain was generalized and then it moved to the right lower quadrant and radiated to her right flank. Pt reports symptoms of headache, fever at 99, and nausea. She denies urinary difficulty, vaginal discharge, vaginal bleeding, or enuresis.     The history is provided by the patient. No  was used.   Abdominal Pain  The current episode started two days ago. The onset of the illness was gradual. The problem has been gradually worsening. The abdominal pain is located in the RUQ. The abdominal pain radiates to the right flank. The other symptoms of the illness include fever and nausea. The other symptoms of the illness do not include vomiting, vaginal discharge or vaginal bleeding. The fever began yesterday. Maximum temperature: 99.   Nausea began 2 days ago.   Review of patient's allergies indicates:  No Known Allergies  History reviewed. No pertinent past medical history.  Past Surgical History:   Procedure Laterality Date    FISSURECTOMY      FOOT SURGERY      OOPHORECTOMY       Family History   Problem Relation Age of Onset    Uterine cancer Mother     Heart disease Father     Ovarian cancer Sister      Social History     Tobacco Use    Smoking status: Former     Types: Cigarettes   Substance Use Topics    Alcohol use: Yes    Drug use: Never     Review of Systems    Constitutional:  Positive for fever.   Gastrointestinal:  Positive for abdominal pain and nausea. Negative for vomiting.        RLQ pain radiating to the right side.    Genitourinary:  Negative for difficulty urinating, enuresis, vaginal bleeding and vaginal discharge.   Neurological:  Positive for headaches.     Physical Exam     Initial Vitals [06/14/23 0629]   BP Pulse Resp Temp SpO2   (!) 148/80 81 14 98.2 °F (36.8 °C) 98 %      MAP       --         Physical Exam    Nursing note and vitals reviewed.  Constitutional: She appears well-developed. No distress.   HENT:   Head: Normocephalic and atraumatic.   Mouth/Throat: Oropharynx is clear and moist.   Eyes: Conjunctivae and EOM are normal. Pupils are equal, round, and reactive to light.   Neck: Neck supple.   Cardiovascular:  Normal rate and regular rhythm.           No murmur heard.  Pulmonary/Chest: Breath sounds normal. No respiratory distress. She exhibits no tenderness.   Abdominal: Abdomen is soft. Bowel sounds are normal. She exhibits no distension. There is abdominal tenderness.   Moderate RLQ tenderness. Heel tap negative.  There is no rebound and no guarding.   Musculoskeletal:         General: Normal range of motion.      Cervical back: Neck supple.      Lumbar back: Normal. Normal range of motion.     Neurological: She is alert and oriented to person, place, and time. She has normal strength. No cranial nerve deficit or sensory deficit.   Psychiatric: She has a normal mood and affect. Judgment normal.       ED Course   Procedures  Labs Reviewed   COMPREHENSIVE METABOLIC PANEL - Abnormal; Notable for the following components:       Result Value    Bilirubin Total 1.7 (*)     All other components within normal limits   URINALYSIS, REFLEX TO URINE CULTURE - Abnormal; Notable for the following components:    Blood, UA 1+ (*)     All other components within normal limits   CBC WITH DIFFERENTIAL - Abnormal; Notable for the following components:    WBC  15.32 (*)     MPV 10.7 (*)     Neut # 13.14 (*)     IG# 0.06 (*)     All other components within normal limits   URINALYSIS, MICROSCOPIC - Normal   CBC W/ AUTO DIFFERENTIAL    Narrative:     The following orders were created for panel order CBC auto differential.  Procedure                               Abnormality         Status                     ---------                               -----------         ------                     CBC with Differential[[id number]]        Abnormal            Final result                 Please view results for these tests on the individual orders.          Imaging Results               CT Abdomen Pelvis With Contrast (Final result)  Result time 06/14/23 10:07:35      Final result by Tona Piña MD (06/14/23 10:07:35)                   Impression:      Findings consistent with acute appendicitis.  The appendix is retrocecal.    Findings were relayed to Dr. Ruby in the emergency department at time of dictation    This report was flagged in Epic as abnormal.      Electronically signed by: Tona Piña  Date:    06/14/2023  Time:    10:07               Narrative:    EXAMINATION:  CT ABDOMEN PELVIS WITH CONTRAST    CLINICAL HISTORY:  RLQ abdominal pain (Age >= 14y);    TECHNIQUE:  Low dose axial images, sagittal and coronal reformations were obtained from the lung bases to the pubic symphysis following the IV administration of contrast. Automatic exposure control (AEC) is utilized to reduce patient radiation exposure.    COMPARISON:  None.    FINDINGS:  The lung bases are clear.    The liver appears normal.  No liver mass or lesion is seen.  Portal and hepatic veins appear normal.    The gallbladder appears normal.  No obvious gallstones are seen.  No biliary dilatation is seen.  No pericholecystic fluid is seen.    The pancreas appears normal.  No pancreatic mass or lesion is seen.    The spleen shows no acute abnormality.    The adrenal glands appear normal.  No  adrenal nodule is seen.    The kidneys appear normal.  No hydronephrosis is seen.  No hydroureter is seen.  No nephrolithiasis is seen.  No obvious ureteral stones are seen.    Urinary bladder appears grossly unremarkable.    The appendix is dilated and inflamed and there are some appendicolith's in the appendix.  Maximum dimension of the appendix is 1.2 cm.  The appendix is not ruptured at this point and there is no abscess or fluid collection is seen.  There are some dilated loops of small bowel seen with air-fluid levels consistent with ileus.    No free air is seen.  No free fluid is seen.                                       Medications   piperacillin-tazobactam (ZOSYN) 4.5 g in dextrose 5 % in water (D5W) 5 % 100 mL IVPB (MB+) (has no administration in time range)   lactated ringers bolus 1,000 mL (0 mLs Intravenous Stopped 6/14/23 0937)   morphine injection 4 mg (4 mg Intravenous Given 6/14/23 0834)   ondansetron injection 4 mg (4 mg Intravenous Given 6/14/23 0834)   iopamidoL (ISOVUE-370) injection 100 mL (100 mLs Intravenous Given 6/14/23 0951)      Medical Decision Making  Differential diagnosis includes but is not limited to appendicitis, diverticulitis, colon perforation, kidney stone, and pyelonephritis.     Amount and/or Complexity of Data Reviewed  Labs: ordered. Decision-making details documented in ED Course.             Scribe Attestation:   Scribe #1: I performed the above scribed service and the documentation accurately describes the services I performed. I attest to the accuracy of the note.    Attending Attestation:           Physician Attestation for Scribe:  Physician Attestation Statement for Scribe #1: I, Reinaldo Ruby MD, reviewed documentation, as scribed by Fortunato Haley in my presence, and it is both accurate and complete.           ED Course as of 06/14/23 1015   Wed Jun 14, 2023   [facility] Surgical hospitalist catrina, radiologist called = appendicitis [NL]      ED Course User  Index  [NL] Reinaldo Ruby MD                 Clinical Impression:   Final diagnoses:  [K37] Appendicitis, unspecified appendicitis type (Primary)        ED Disposition Condition    Admit Stable                Reinaldo Ruby MD  06/14/23 1016

## 2023-06-14 NOTE — OP NOTE
[facility]                       [address] [place] [id number]    PATIENT NAME:      DOUGLAS CESAR   YOB: 1960  CSN:               [id number]  MRN:               [id number]  ADMIT DATE:        06/14/2023 06:32:00  PHYSICIAN:         Tommy eMsa MD                          OPERATIVE REPORT      DATE OF SURGERY:    06/14/2023 00:00:00    SURGEON:  Tommy Mesa MD    PROCEDURE:  Laparoscopic appendectomy.    PREOPERATIVE DIAGNOSIS:  Appendicitis.    POSTOPERATIVE DIAGNOSIS:  Appendicitis.    ANESTHESIA:  General endotracheal anesthesia with local to sites.    COMPLICATIONS:  None.    ESTIMATED BLOOD LOSS:  None.    ASSISTANT:  Elise Lawson.    SPECIMEN:  Appendix.    INDICATION FOR PROCEDURE:  This is a 62-year-old female with a 1-day history of   pain in the generalized abdomen that shifts to the right lower quadrant.  She   had anorexia.  She had classic signs of appendicitis by history, physical, and   imaging.  We took her to the operating room for an appendectomy.    FINDINGS:  Inflamed appendix with no evidence of rupture.    PROCEDURE IN DETAIL:  The patient was brought to the operating room.  She was   brought under general endotracheal anesthesia.  She was prepped and draped in   sterile fashion.  Antibiotics given and a time-out was called.  A supraumbilical   Veress access of the abdomen was performed with water drop test.  There was no   evidence of bowel or trocar injury.  The abdomen was insufflated to 15 mmHg.    Two 5 mm ports in left lower quadrant, 12 mm port were placed.  The appendix was   quickly visualized in the right lower quadrant.  It was inflamed.  It was   bluntly dissected off the cecum very easily.  The mesentery of the appendix was   taken with the Harmonic Scalpel.  The appendix was transected from the base of   the cecum with the laparoscopic stapler.  There was an  excellent staple line.    There was a small amount of clear fluid in the pelvis that was suctioned dry.    There are no other abnormalities.  The staple line was intact and hemostasis was   good.  Lap, sponge, needle, and instrument counts correct.  The left lower   quadrant port was closed percutaneously with 0 Vicryl in the fascia.  The skin   was closed with 4-0 Vicryl.  The patient tolerated the procedure well, was   awoken without difficulty and brought to recovery room without further event.        ______________________________  MD LE Damian/AQS  DD:  06/14/2023  Time:  02:03PM  DT:  06/14/2023  Time:  02:37PM  Job #:  [id number]      OPERATIVE REPORT

## 2023-06-14 NOTE — H&P
Acute Care Surgery   History and Physical    Patient Name: Shira Ying  YOB: 1960  Date: 06/14/2023 10:36 AM  Date of Admission: 6/14/2023  HD#0  POD#* No surgery found *    PRESENTING HISTORY   Chief Complaint/Reason for Admission: acute appendicities    History of Present Illness:  61 y/o F with no PMHx presenting to ED today with 2 days of abdominal pain that has progressively gotten worse. She states that she has associated nausea but no vomiting. Had a nonbloody BM this morning but states that she has felt more constipated over the last few days. Denies fevers, chills, chest pain, shortness of breath. Patient afebrile and hemodynamically stable at this time, WBC 15. CT abdomen pelvis concerning for acute non-perforated appendicitis with no abscess.    Review of Systems:  12 point ROS negative except as stated in HPI    PAST HISTORY:   Past medical history:  History reviewed. No pertinent past medical history.    Past surgical history:  Past Surgical History:   Procedure Laterality Date    FISSURECTOMY      FOOT SURGERY      OOPHORECTOMY         Family history:  Family History   Problem Relation Age of Onset    Uterine cancer Mother     Heart disease Father     Ovarian cancer Sister        Social history:  Social History     Socioeconomic History    Marital status:    Tobacco Use    Smoking status: Former     Types: Cigarettes   Substance and Sexual Activity    Alcohol use: Yes    Drug use: Never    Sexual activity: Yes     Social History     Tobacco Use   Smoking Status Former    Types: Cigarettes   Smokeless Tobacco Not on file      Social History     Substance and Sexual Activity   Alcohol Use Yes        MEDICATIONS & ALLERGIES:     No current facility-administered medications on file prior to encounter.     Current Outpatient Medications on File Prior to Encounter   Medication Sig    Lactobacillus rhamnosus GG (CULTURELLE) 10 billion cell capsule Take 1 capsule by mouth  "once daily.    magnesium citrate 100 mg Cap Take by mouth.    multivitamin with minerals tablet Take 1 tablet by mouth once daily.    omega-3 fatty acids/fish oil (FISH OIL-OMEGA-3 FATTY ACIDS) 300-1,000 mg capsule Take by mouth once daily.    vitamin D (VITAMIN D3) 1000 units Tab Take by mouth once daily.       Allergies: Review of patient's allergies indicates:  No Known Allergies    Scheduled Meds:   piperacillin-tazobactam (Zosyn) IV (PEDS and ADULTS) (extended infusion is not appropriate)  4.5 g Intravenous ED 1 Time    piperacillin-tazobactam (Zosyn) IV (PEDS and ADULTS) (extended infusion is not appropriate)  4.5 g Intravenous Q8H       Continuous Infusions:    PRN Meds:acetaminophen, acetaminophen, LIDOcaine (PF) 10 mg/ml (1%), melatonin, ondansetron, oxyCODONE, oxyCODONE, sodium chloride 0.9%    OBJECTIVE:   Vital Signs:  VITAL SIGNS: 24 HR MIN & MAX LAST   Temp  Min: 98.2 °F (36.8 °C)  Max: 98.2 °F (36.8 °C)  98.2 °F (36.8 °C)   BP  Min: 143/89  Max: 149/74  (!) 149/74    Pulse  Min: 67  Max: 81  67    Resp  Min: 14  Max: 19  17    SpO2  Min: 97 %  Max: 100 %  97 %      HT: 5' 6" (167.6 cm)  WT: 71.7 kg (158 lb)  BMI: 25.5     Intake/output:  Intake/Output - Last 3 Shifts         06/12 0700  06/13 0659 06/13 0700 06/14 0659 06/14 0700  06/15 0659    IV Piggyback   1000    Total Intake(mL/kg)   1000 (13.9)    Net   +1000                   Intake/Output Summary (Last 24 hours) at 6/14/2023 1036  Last data filed at 6/14/2023 0937  Gross per 24 hour   Intake 1000 ml   Output --   Net 1000 ml         Physical Exam:  General: Well developed, well nourished, no acute distress  HEENT: Normocephalic, atraumatic, PERRL  CV: RRR  Resp: NWOB on room air  GI:  Abdomen soft, tender to palpation in RLQ. No guarding or rigidity noted  :  Deferred  MSK: No muscle atrophy, cyanosis, peripheral edema, moving all extremities spontaneously  Skin/wounds:  No rashes, ulcers, erythema  Neuro:  CNII-XII grossly intact, alert " and oriented to person, place, and time    Labs:  Troponin:  No results for input(s): TROPONINI in the last 72 hours.  CBC:  Recent Labs     06/14/23  0647   WBC 15.32*   RBC 4.58   HGB 14.0   HCT 42.1      MCV 91.9   MCH 30.6   MCHC 33.3     CMP:  Recent Labs     06/14/23  0647   CALCIUM 9.6   ALBUMIN 4.2      K 4.4   CO2 26   BUN 13.6   CREATININE 0.98   ALKPHOS 62   ALT 20   AST 25   BILITOT 1.7*     Lactic Acid:  No results for input(s): LACTATE in the last 72 hours.  ETOH:  No results for input(s): ETHANOL in the last 72 hours.   Urine Drug Screen:  No results for input(s): COCAINE, OPIATE, BARBITURATE, AMPHETAMINE, FENTANYL, CANNABINOIDS, MDMA in the last 72 hours.    Invalid input(s): BENZODIAZEPINE, PHENCYCLIDINE   ABG:  No results for input(s): PH, PO2, PCO2, HCO3, BE in the last 168 hours.     Diagnostic Results:  CT Abdomen Pelvis With Contrast   Final Result   Abnormal      Findings consistent with acute appendicitis.  The appendix is retrocecal.      Findings were relayed to Dr. Ruby in the emergency department at time of dictation      This report was flagged in Epic as abnormal.         Electronically signed by: Tona Piña   Date:    06/14/2023   Time:    10:07          ASSESSMENT & PLAN:    63 y/o F with no PMHx presenting with acute appendicitis.     -Admit to general surgery  -Plan to take pt to OR today for lap vs open appendectomy  -Surgical consent obtained  -Keep NPO  -Zosyn  -multimodal pain control    Elise Lawson MD  [employer] General Surgery PGY-1

## 2023-06-15 ENCOUNTER — PATIENT MESSAGE (OUTPATIENT)
Dept: INTERNAL MEDICINE | Facility: CLINIC | Age: 63
End: 2023-06-15
Payer: COMMERCIAL

## 2023-06-15 LAB — PSYCHE PATHOLOGY RESULT: NORMAL

## 2023-06-15 NOTE — ANESTHESIA POSTPROCEDURE EVALUATION
Anesthesia Post Evaluation    Patient: Shira Ying    Procedure(s) Performed: Procedure(s) (LRB):  APPENDECTOMY, LAPAROSCOPIC (N/A)    Final Anesthesia Type: general      Patient location during evaluation: PACU  Patient participation: Yes- Able to Participate  Level of consciousness: awake and alert  Post-procedure vital signs: reviewed and stable  Pain management: adequate  Airway patency: patent      Anesthetic complications: no      Cardiovascular status: hemodynamically stable  Respiratory status: unassisted  Hydration status: euvolemic  Follow-up not needed.          Vitals Value Taken Time   /78 06/14/23 1644   Temp 36.7 06/15/23 1039   Pulse 69 06/14/23 1644   Resp 16 06/14/23 1533   SpO2 94 % 06/14/23 1644   Vitals shown include unvalidated device data.      Event Time   Out of Recovery 15:35:30         Pain/Alexis Score: Pain Rating Prior to Med Admin: 9 (6/14/2023 10:53 AM)  Alexis Score: 10 (6/14/2023  5:36 PM)  Modified Alexis Score: 20 (6/14/2023  5:36 PM)

## 2023-06-15 NOTE — TELEPHONE ENCOUNTER
The pathology report basically states that she had severe appendicitis.  The pathologist looks at the appendix under a microscope to make sure there was is no cancer present.  There was no cancer

## 2023-06-21 ENCOUNTER — TELEPHONE (OUTPATIENT)
Dept: INTERNAL MEDICINE | Facility: CLINIC | Age: 63
End: 2023-06-21
Payer: COMMERCIAL

## 2023-06-21 NOTE — TELEPHONE ENCOUNTER
I can see her tomorrow if needed, especially if there is worsening redness or pus, but I would expect her to have soreness around incision site for 1-2 weeks.

## 2023-06-21 NOTE — TELEPHONE ENCOUNTER
Spoke to patient she had an ER appendectomy and the person assigned to her for aftercare is out on vacation. She wants a call back for some reassurance. She states that her abdomen is still swollen, burning around the incision, and pain on the left side where they went in.    FYI: she has an appt with Dr. Thayer on 7/13/23 and GYN: 9/20/23

## 2023-06-22 ENCOUNTER — OFFICE VISIT (OUTPATIENT)
Dept: INTERNAL MEDICINE | Facility: CLINIC | Age: 63
End: 2023-06-22
Payer: COMMERCIAL

## 2023-06-22 DIAGNOSIS — K37 APPENDICITIS, UNSPECIFIED APPENDICITIS TYPE: Primary | ICD-10-CM

## 2023-06-22 PROCEDURE — 99495 TRANSJ CARE MGMT MOD F2F 14D: CPT | Mod: 95,,, | Performed by: INTERNAL MEDICINE

## 2023-06-22 PROCEDURE — 1159F MED LIST DOCD IN RCRD: CPT | Mod: CPTII,95,, | Performed by: INTERNAL MEDICINE

## 2023-06-22 PROCEDURE — 99495 TCM SERVICES (MODERATE COMPLEXITY): ICD-10-PCS | Mod: 95,,, | Performed by: INTERNAL MEDICINE

## 2023-06-22 PROCEDURE — 1160F RVW MEDS BY RX/DR IN RCRD: CPT | Mod: CPTII,95,, | Performed by: INTERNAL MEDICINE

## 2023-06-22 PROCEDURE — 1159F PR MEDICATION LIST DOCUMENTED IN MEDICAL RECORD: ICD-10-PCS | Mod: CPTII,95,, | Performed by: INTERNAL MEDICINE

## 2023-06-22 PROCEDURE — 1160F PR REVIEW ALL MEDS BY PRESCRIBER/CLIN PHARMACIST DOCUMENTED: ICD-10-PCS | Mod: CPTII,95,, | Performed by: INTERNAL MEDICINE

## 2023-06-22 NOTE — PROGRESS NOTES
Subjective:       Patient ID: Shira Ying is a 62 y.o. female.    Chief Complaint: No chief complaint on file.    The patient location is: Home, [facility]  The chief complaint leading to consultation is: Appendicitis    Visit type: audiovisual    Face to Face time with patient: 10 minutes  15 minutes of total time spent on the encounter, which includes face to face time and non-face to face time preparing to see the patient (eg, review of tests), Obtaining and/or reviewing separately obtained history, Documenting clinical information in the electronic or other health record, Independently interpreting results (not separately reported) and communicating results to the patient/family/caregiver, or Care coordination (not separately reported).         Each patient to whom he or she provides medical services by telemedicine is:  (1) informed of the relationship between the physician and patient and the respective role of any other health care provider with respect to management of the patient; and (2) notified that he or she may decline to receive medical services by telemedicine and may withdraw from such care at any time.    Notes:    62-year-old white female is evaluated today for follow-up of appendicitis.  She was hospitalized 6/14 underwent surgery that same day, no complications per the report.  She has mild pain in the left lower quadrant.  There is no redness or pus coming from the incision site.  She denies fever.  She also feels bloated    Abdominal Pain  This is a new problem. The current episode started in the past 7 days. The onset quality is sudden. The problem occurs constantly. The problem has been unchanged. The pain is located in the LLQ. The pain is at a severity of 3/10. The pain is moderate. The quality of the pain is aching and burning. Associated symptoms include belching, diarrhea and myalgias. Pertinent negatives include no fever or nausea. The pain is relieved by Certain positions.  She has tried acetaminophen for the symptoms. The treatment provided moderate relief. Prior diagnostic workup includes surgery. Her past medical history is significant for abdominal surgery. There is no history of colon cancer, Crohn's disease, gallstones, GERD, irritable bowel syndrome, pancreatitis, PUD or ulcerative colitis. Patient's medical history does not include kidney stones and UTI.   Review of Systems   Constitutional:  Negative for fever.   Gastrointestinal:  Positive for abdominal distention, abdominal pain and diarrhea. Negative for nausea.   Musculoskeletal:  Positive for myalgias.   Psychiatric/Behavioral:  Negative for agitation. The patient is nervous/anxious.        Objective:      Physical Exam  Constitutional:       General: She is not in acute distress.  Pulmonary:      Effort: No respiratory distress.   Musculoskeletal:         General: No swelling.   Neurological:      Mental Status: She is alert and oriented to person, place, and time.   Psychiatric:         Mood and Affect: Mood normal.       There were no vitals filed for this visit.   Assessment:       Problem List Items Addressed This Visit    None      Medication List with Changes/Refills   Current Medications    LACTOBACILLUS RHAMNOSUS GG (CULTURELLE) 10 BILLION CELL CAPSULE    Take 1 capsule by mouth once daily.    MAGNESIUM CITRATE 100 MG CAP    Take by mouth.    MULTIVITAMIN WITH MINERALS TABLET    Take 1 tablet by mouth once daily.    OMEGA-3 FATTY ACIDS/FISH OIL (FISH OIL-OMEGA-3 FATTY ACIDS) 300-1,000 MG CAPSULE    Take by mouth once daily.    OXYCODONE (ROXICODONE) 5 MG IMMEDIATE RELEASE TABLET    Take 1 tablet (5 mg total) by mouth every 4 (four) hours as needed for Pain.    VITAMIN D (VITAMIN D3) 1000 UNITS TAB    Take by mouth once daily.        Plan:       Appendicitis:  She underwent surgery 6/14, about 8 days ago, without complication.  She was admitted at 6:00 a.m. and was discharged home at 5:00 p.m..  Today is the 1st  day she does not need Tylenol or Motrin.  Bowel movements have been almost normal.  There is no pus or erythema at the sites incision, and she is not having fever.  I encouraged her to remove the Steri-Strips and to stay the course.  Tylenol as needed for pain.  ER notes and hospital notes reviewed in detail      Transitional Care Note    Family and/or Caretaker present at visit?  No.  Diagnostic tests reviewed/disposition: No diagnosic tests pending after this hospitalization.  Disease/illness education: yes  Home health/community services discussion/referrals: Patient does not have home health established from hospital visit.  They do not need home health.  If needed, we will set up home health for the patient.   Establishment or re-establishment of referral orders for community resources: No other necessary community resources.   Discussion with other health care providers: No discussion with other health care providers necessary.

## 2023-06-26 ENCOUNTER — PATIENT MESSAGE (OUTPATIENT)
Dept: ADMINISTRATIVE | Facility: HOSPITAL | Age: 63
End: 2023-06-26
Payer: COMMERCIAL

## 2023-06-27 ENCOUNTER — DOCUMENTATION ONLY (OUTPATIENT)
Dept: SURGERY | Facility: HOSPITAL | Age: 63
End: 2023-06-27
Payer: COMMERCIAL

## 2023-06-27 NOTE — PROGRESS NOTES
Televisit Note     Date of surgery:  06/14/2023  Procedure:  Laparoscopic appendectomy  Surgeon: Dr. Bonita Ying is doing well. Has no complaints. Reports her incision sites have healed up. Tolerating a diet. Having bowel movements. Denies fever, chills, nausea, vomiting, and changes in bowel movement. Endorses some bloating but has improved since discharge. She reports ambulating throughout the day. Pathology reviewed. Pathology report (see below) was discussed with the patient. Post-op care discussed. All questions were answered. Recommended to call our clinic as needed with any concerns. Follow up with primary care provider. Return precautions provided.    Pathology:  FINAL DIAGNOSIS       APPENDIX, APPENDECTOMY:       ACUTE APPENDICITIS AND PERIAPPENDICITIS, SEVERE.       CODE 7       Electronically Signed by:   Quinn DICKERSONHIMMANUEL , Pathologist   (Case signed 06/15/2023 at 11:36am)       Source   APPENDIX       Clinical Information   APPENDICITIS, UNSPECIFIED APPENDICITIS TYPE       Gross Description   [id number], Shira Krvicky `Appendix.`  Labeled on the container with the   patient's name `Appendix, [id number].`  Received in formalin and consist of a   vermiform appendix measuring 9 cm in length x 1.5 cm in greatest diameter.  The   serosa is glistening tan-pink.  The lumen contains abundant watery fecal   material with three fecalith ranging from 0.7-1.7 cm in greatest dimension.  The   wall averages 0.1 cm in thickness.  No masses or perforations are grossly   identified.  Representative sections submitted in cassettes [id number] as follows:   1A- Inked proximal margin and distal tip.   1B- Representative sections.   x-f-2       [id number]       Microscopic Description   1. Microscopic examination performed.  Please see diagnosis.

## 2023-07-10 ENCOUNTER — OFFICE VISIT (OUTPATIENT)
Dept: INTERNAL MEDICINE | Facility: CLINIC | Age: 63
End: 2023-07-10
Payer: COMMERCIAL

## 2023-07-10 VITALS
TEMPERATURE: 99 F | WEIGHT: 158 LBS | RESPIRATION RATE: 14 BRPM | BODY MASS INDEX: 25.39 KG/M2 | HEART RATE: 68 BPM | DIASTOLIC BLOOD PRESSURE: 62 MMHG | HEIGHT: 66 IN | SYSTOLIC BLOOD PRESSURE: 126 MMHG | OXYGEN SATURATION: 97 %

## 2023-07-10 DIAGNOSIS — K37 APPENDICITIS, UNSPECIFIED APPENDICITIS TYPE: Primary | ICD-10-CM

## 2023-07-10 PROCEDURE — 1160F PR REVIEW ALL MEDS BY PRESCRIBER/CLIN PHARMACIST DOCUMENTED: ICD-10-PCS | Mod: CPTII,,, | Performed by: INTERNAL MEDICINE

## 2023-07-10 PROCEDURE — 99213 PR OFFICE/OUTPT VISIT, EST, LEVL III, 20-29 MIN: ICD-10-PCS | Mod: ,,, | Performed by: INTERNAL MEDICINE

## 2023-07-10 PROCEDURE — 3078F PR MOST RECENT DIASTOLIC BLOOD PRESSURE < 80 MM HG: ICD-10-PCS | Mod: CPTII,,, | Performed by: INTERNAL MEDICINE

## 2023-07-10 PROCEDURE — 3074F PR MOST RECENT SYSTOLIC BLOOD PRESSURE < 130 MM HG: ICD-10-PCS | Mod: CPTII,,, | Performed by: INTERNAL MEDICINE

## 2023-07-10 PROCEDURE — 3074F SYST BP LT 130 MM HG: CPT | Mod: CPTII,,, | Performed by: INTERNAL MEDICINE

## 2023-07-10 PROCEDURE — 3008F PR BODY MASS INDEX (BMI) DOCUMENTED: ICD-10-PCS | Mod: CPTII,,, | Performed by: INTERNAL MEDICINE

## 2023-07-10 PROCEDURE — 3078F DIAST BP <80 MM HG: CPT | Mod: CPTII,,, | Performed by: INTERNAL MEDICINE

## 2023-07-10 PROCEDURE — 99213 OFFICE O/P EST LOW 20 MIN: CPT | Mod: ,,, | Performed by: INTERNAL MEDICINE

## 2023-07-10 PROCEDURE — 3008F BODY MASS INDEX DOCD: CPT | Mod: CPTII,,, | Performed by: INTERNAL MEDICINE

## 2023-07-10 PROCEDURE — 1160F RVW MEDS BY RX/DR IN RCRD: CPT | Mod: CPTII,,, | Performed by: INTERNAL MEDICINE

## 2023-07-10 PROCEDURE — 1159F MED LIST DOCD IN RCRD: CPT | Mod: CPTII,,, | Performed by: INTERNAL MEDICINE

## 2023-07-10 PROCEDURE — 1159F PR MEDICATION LIST DOCUMENTED IN MEDICAL RECORD: ICD-10-PCS | Mod: CPTII,,, | Performed by: INTERNAL MEDICINE

## 2023-07-10 NOTE — PROGRESS NOTES
"Subjective:       Patient ID: Shira Ying is a 62 y.o. female.    Chief Complaint: Follow-up    62-year-old female is here for follow-up of appendicitis.  She had appendectomy about 1 month ago.  She is better, but still has bloating and mild lower abdominal pain occasionally.  When she has a bowel movement, she feels like she is not fully evacuating stool.  She had some constipation before appendicitis, occasionally taking a laxative    Review of Systems   Constitutional:  Negative for fever.   HENT:  Negative for nosebleeds.    Eyes:  Negative for visual disturbance.   Respiratory:  Negative for shortness of breath.    Cardiovascular:  Negative for chest pain.   Gastrointestinal:  Positive for constipation. Negative for abdominal pain.   Genitourinary:  Negative for dysuria.   Musculoskeletal:  Negative for gait problem.   Neurological:  Negative for headaches.       Objective:      Physical Exam  HENT:      Head: Normocephalic.   Pulmonary:      Effort: No respiratory distress.   Abdominal:      Palpations: Abdomen is soft.      Tenderness: There is no abdominal tenderness.   Musculoskeletal:         General: No swelling.   Skin:     General: Skin is warm.   Neurological:      General: No focal deficit present.      Mental Status: She is alert and oriented to person, place, and time.   Psychiatric:         Mood and Affect: Mood normal.       Vitals:    07/10/23 1440   BP: 126/62   Pulse: 68   Resp: 14   Temp: 98.5 °F (36.9 °C)   SpO2: 97%   Weight: 71.7 kg (158 lb)   Height: 5' 6" (1.676 m)      Assessment:       Problem List Items Addressed This Visit          GI    Appendicitis - Primary       Medication List with Changes/Refills   Current Medications    LACTOBACILLUS RHAMNOSUS GG (CULTURELLE) 10 BILLION CELL CAPSULE    Take 1 capsule by mouth once daily.    MAGNESIUM CITRATE 100 MG CAP    Take by mouth.    MULTIVITAMIN WITH MINERALS TABLET    Take 1 tablet by mouth once daily.    OMEGA-3 FATTY " ACIDS/FISH OIL (FISH OIL-OMEGA-3 FATTY ACIDS) 300-1,000 MG CAPSULE    Take by mouth once daily.    VITAMIN D (VITAMIN D3) 1000 UNITS TAB    Take by mouth once daily.   Discontinued Medications    OXYCODONE (ROXICODONE) 5 MG IMMEDIATE RELEASE TABLET    Take 1 tablet (5 mg total) by mouth every 4 (four) hours as needed for Pain.        Plan:       Appendicitis  Constipation     She underwent appendectomy June 14th.  There is no tenderness on exam today.  She is concerned about bloating and mild constipation.  We discussed taking Metamucil.  There are no red flags today.  No need for imaging at this time

## 2023-07-25 ENCOUNTER — PATIENT MESSAGE (OUTPATIENT)
Dept: ADMINISTRATIVE | Facility: HOSPITAL | Age: 63
End: 2023-07-25
Payer: COMMERCIAL

## 2023-08-07 ENCOUNTER — DOCUMENTATION ONLY (OUTPATIENT)
Dept: ADMINISTRATIVE | Facility: HOSPITAL | Age: 63
End: 2023-08-07
Payer: COMMERCIAL

## 2023-08-10 ENCOUNTER — PATIENT MESSAGE (OUTPATIENT)
Dept: INTERNAL MEDICINE | Facility: CLINIC | Age: 63
End: 2023-08-10
Payer: COMMERCIAL

## 2023-08-21 ENCOUNTER — PATIENT MESSAGE (OUTPATIENT)
Dept: INTERNAL MEDICINE | Facility: CLINIC | Age: 63
End: 2023-08-21
Payer: COMMERCIAL

## 2023-09-07 ENCOUNTER — PATIENT MESSAGE (OUTPATIENT)
Dept: RESEARCH | Facility: HOSPITAL | Age: 63
End: 2023-09-07
Payer: COMMERCIAL

## 2023-09-21 ENCOUNTER — PATIENT MESSAGE (OUTPATIENT)
Dept: INTERNAL MEDICINE | Facility: CLINIC | Age: 63
End: 2023-09-21
Payer: COMMERCIAL

## 2023-09-21 NOTE — TELEPHONE ENCOUNTER
Testosterone replacement in females is used commonly.  The topical testosterone treatments (cream, lotion) are safer than the other types of testosterone replacement. Therefore, it is okay to try

## 2023-09-24 ENCOUNTER — PATIENT MESSAGE (OUTPATIENT)
Dept: INTERNAL MEDICINE | Facility: CLINIC | Age: 63
End: 2023-09-24
Payer: COMMERCIAL

## 2023-10-03 ENCOUNTER — DOCUMENTATION ONLY (OUTPATIENT)
Dept: INTERNAL MEDICINE | Facility: CLINIC | Age: 63
End: 2023-10-03
Payer: COMMERCIAL

## 2023-10-11 ENCOUNTER — DOCUMENTATION ONLY (OUTPATIENT)
Dept: INTERNAL MEDICINE | Facility: CLINIC | Age: 63
End: 2023-10-11
Payer: COMMERCIAL

## 2023-10-11 LAB
HUMAN PAPILLOMAVIRUS (HPV): NORMAL
PAP RECOMMENDATION EXT: NORMAL
PAP SMEAR: NORMAL

## 2023-11-03 ENCOUNTER — PATIENT MESSAGE (OUTPATIENT)
Dept: INTERNAL MEDICINE | Facility: CLINIC | Age: 63
End: 2023-11-03
Payer: COMMERCIAL

## 2023-11-06 ENCOUNTER — TELEPHONE (OUTPATIENT)
Dept: INTERNAL MEDICINE | Facility: CLINIC | Age: 63
End: 2023-11-06
Payer: COMMERCIAL

## 2023-11-06 NOTE — TELEPHONE ENCOUNTER
----- Message from Soila Sharma sent at 11/6/2023  9:14 AM CST -----  Regarding: appt  Pt will wait till Wed to see Dr. Saab, did not want to see NP.  If we get any cancels I'll call her.

## 2023-11-09 ENCOUNTER — PATIENT MESSAGE (OUTPATIENT)
Dept: INTERNAL MEDICINE | Facility: CLINIC | Age: 63
End: 2023-11-09
Payer: COMMERCIAL

## 2023-11-15 ENCOUNTER — OFFICE VISIT (OUTPATIENT)
Dept: INTERNAL MEDICINE | Facility: CLINIC | Age: 63
End: 2023-11-15
Payer: COMMERCIAL

## 2023-11-15 VITALS
OXYGEN SATURATION: 99 % | BODY MASS INDEX: 26.2 KG/M2 | DIASTOLIC BLOOD PRESSURE: 76 MMHG | HEART RATE: 60 BPM | SYSTOLIC BLOOD PRESSURE: 118 MMHG | TEMPERATURE: 98 F | HEIGHT: 66 IN | RESPIRATION RATE: 14 BRPM | WEIGHT: 163 LBS

## 2023-11-15 DIAGNOSIS — R20.2 PARESTHESIA OF LEFT LEG: Primary | ICD-10-CM

## 2023-11-15 PROCEDURE — 1159F MED LIST DOCD IN RCRD: CPT | Mod: CPTII,,, | Performed by: INTERNAL MEDICINE

## 2023-11-15 PROCEDURE — 1160F PR REVIEW ALL MEDS BY PRESCRIBER/CLIN PHARMACIST DOCUMENTED: ICD-10-PCS | Mod: CPTII,,, | Performed by: INTERNAL MEDICINE

## 2023-11-15 PROCEDURE — 1159F PR MEDICATION LIST DOCUMENTED IN MEDICAL RECORD: ICD-10-PCS | Mod: CPTII,,, | Performed by: INTERNAL MEDICINE

## 2023-11-15 PROCEDURE — 3008F PR BODY MASS INDEX (BMI) DOCUMENTED: ICD-10-PCS | Mod: CPTII,,, | Performed by: INTERNAL MEDICINE

## 2023-11-15 PROCEDURE — 3074F SYST BP LT 130 MM HG: CPT | Mod: CPTII,,, | Performed by: INTERNAL MEDICINE

## 2023-11-15 PROCEDURE — 99213 PR OFFICE/OUTPT VISIT, EST, LEVL III, 20-29 MIN: ICD-10-PCS | Mod: ,,, | Performed by: INTERNAL MEDICINE

## 2023-11-15 PROCEDURE — 3074F PR MOST RECENT SYSTOLIC BLOOD PRESSURE < 130 MM HG: ICD-10-PCS | Mod: CPTII,,, | Performed by: INTERNAL MEDICINE

## 2023-11-15 PROCEDURE — 3008F BODY MASS INDEX DOCD: CPT | Mod: CPTII,,, | Performed by: INTERNAL MEDICINE

## 2023-11-15 PROCEDURE — 3078F PR MOST RECENT DIASTOLIC BLOOD PRESSURE < 80 MM HG: ICD-10-PCS | Mod: CPTII,,, | Performed by: INTERNAL MEDICINE

## 2023-11-15 PROCEDURE — 1160F RVW MEDS BY RX/DR IN RCRD: CPT | Mod: CPTII,,, | Performed by: INTERNAL MEDICINE

## 2023-11-15 PROCEDURE — 99213 OFFICE O/P EST LOW 20 MIN: CPT | Mod: ,,, | Performed by: INTERNAL MEDICINE

## 2023-11-15 PROCEDURE — 3078F DIAST BP <80 MM HG: CPT | Mod: CPTII,,, | Performed by: INTERNAL MEDICINE

## 2023-11-15 RX ORDER — GABAPENTIN 100 MG/1
100 CAPSULE ORAL 2 TIMES DAILY
Qty: 60 CAPSULE | Refills: 11 | Status: SHIPPED | OUTPATIENT
Start: 2023-11-15 | End: 2024-01-31

## 2023-11-15 NOTE — PROGRESS NOTES
"Subjective:       Patient ID: Shira Ying is a 63 y.o. female.    Chief Complaint: Leg Pain    63-year-old female reports pain in her left foot and ankle over the past month.  There was no inciting event or trauma.  She describes the pain as an electrical shock proximal to the left ankle anteriorly, worse at night.  She has full range of motion of the left foot.  She also reports mild right-sided lower back pain over the past few weeks, but not much over the past 2 weeks.  She is currently seeing a chiropractor.    Leg Pain       Review of Systems   Constitutional:  Negative for fever.   HENT:  Negative for nosebleeds.    Eyes:  Negative for visual disturbance.   Respiratory:  Negative for shortness of breath.    Cardiovascular:  Negative for chest pain.   Gastrointestinal:  Negative for abdominal pain.   Genitourinary:  Negative for dysuria.   Musculoskeletal:  Positive for back pain and leg pain. Negative for gait problem.   Neurological:  Negative for headaches.         Objective:      Physical Exam  HENT:      Head: Normocephalic.   Pulmonary:      Effort: No respiratory distress.   Musculoskeletal:      Right lower leg: No edema.      Left lower leg: No edema.   Neurological:      Mental Status: She is alert and oriented to person, place, and time.      Motor: No weakness.      Gait: Gait normal.   Psychiatric:         Mood and Affect: Mood normal.         Vitals:    11/15/23 1346   BP: 118/76   Pulse: 60   Resp: 14   Temp: 97.9 °F (36.6 °C)   SpO2: 99%   Weight: 73.9 kg (163 lb)   Height: 5' 6" (1.676 m)      Assessment:       Problem List Items Addressed This Visit    None  Visit Diagnoses       Paresthesia of left leg    -  Primary    Relevant Orders    Ambulatory referral/consult to Pain Clinic            Medication List with Changes/Refills   New Medications    GABAPENTIN (NEURONTIN) 100 MG CAPSULE    Take 1 capsule (100 mg total) by mouth 2 (two) times daily.   Current Medications    " LACTOBACILLUS RHAMNOSUS GG (CULTURELLE) 10 BILLION CELL CAPSULE    Take 1 capsule by mouth once daily.    MAGNESIUM CITRATE 100 MG CAP    Take by mouth.    MULTIVITAMIN WITH MINERALS TABLET    Take 1 tablet by mouth once daily.    OMEGA-3 FATTY ACIDS/FISH OIL (FISH OIL-OMEGA-3 FATTY ACIDS) 300-1,000 MG CAPSULE    Take by mouth once daily.    VITAMIN D (VITAMIN D3) 1000 UNITS TAB    Take by mouth once daily.        Plan:       Left leg paresthesia:  She has full range of motion on exam today and no tenderness to palpation.  She has been taking ibuprofen daily with only some relief.  If she does not take ibuprofen, she has pain throughout the day.  She describes the pain as an electrical shock.  Schedule nerve conduction study

## 2023-11-17 ENCOUNTER — PATIENT MESSAGE (OUTPATIENT)
Dept: INTERNAL MEDICINE | Facility: CLINIC | Age: 63
End: 2023-11-17
Payer: COMMERCIAL

## 2023-11-20 ENCOUNTER — PATIENT MESSAGE (OUTPATIENT)
Dept: INTERNAL MEDICINE | Facility: CLINIC | Age: 63
End: 2023-11-20
Payer: COMMERCIAL

## 2023-12-21 ENCOUNTER — PATIENT MESSAGE (OUTPATIENT)
Dept: INTERNAL MEDICINE | Facility: CLINIC | Age: 63
End: 2023-12-21
Payer: COMMERCIAL

## 2023-12-21 DIAGNOSIS — M54.9 DORSALGIA, UNSPECIFIED: Primary | ICD-10-CM

## 2023-12-22 NOTE — TELEPHONE ENCOUNTER
If she has not had MRI L spine, then order without contrast. Did she see Corrie? If so, get records of visit and nerve conduction study

## 2023-12-26 ENCOUNTER — PATIENT MESSAGE (OUTPATIENT)
Dept: INTERNAL MEDICINE | Facility: CLINIC | Age: 63
End: 2023-12-26
Payer: COMMERCIAL

## 2023-12-26 DIAGNOSIS — M54.12 CERVICAL RADICULOPATHY: ICD-10-CM

## 2023-12-26 DIAGNOSIS — M54.9 DORSALGIA, UNSPECIFIED: Primary | ICD-10-CM

## 2023-12-26 DIAGNOSIS — R20.2 PARESTHESIA OF LEFT LEG: ICD-10-CM

## 2023-12-26 DIAGNOSIS — M54.2 NECK PAIN: ICD-10-CM

## 2023-12-26 DIAGNOSIS — R20.2 PARESTHESIA: ICD-10-CM

## 2023-12-27 ENCOUNTER — TELEPHONE (OUTPATIENT)
Dept: NEUROSURGERY | Facility: CLINIC | Age: 63
End: 2023-12-27
Payer: COMMERCIAL

## 2023-12-27 NOTE — TELEPHONE ENCOUNTER
I did receive the referral for Dr. Mondragon. I tried to call the patient to get a little more information regarding the pain she is having. She did not answer so I LMOM.

## 2023-12-29 NOTE — TELEPHONE ENCOUNTER
Because of neck pain with radiculopathy and paresthesia, we can order MRI cervical spine without contrast
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
## 2024-01-02 ENCOUNTER — PATIENT MESSAGE (OUTPATIENT)
Dept: INTERNAL MEDICINE | Facility: CLINIC | Age: 64
End: 2024-01-02
Payer: COMMERCIAL

## 2024-01-02 NOTE — TELEPHONE ENCOUNTER
The patient returned my call. I did offer her to come in a little earlier than her scheduled appointment. I r/s her to 1/4/24 at 10:00. She was compliant w date and time.

## 2024-01-02 NOTE — TELEPHONE ENCOUNTER
I tried to call the patient in regards to her upcoming appointment with Dr. Mondragon. I did want to offer her to come in on 1/4/24. She did not answer so I LMOM.

## 2024-01-03 NOTE — PROGRESS NOTES
[facility]   Neurosurgery      Shira Ying  MRN: [id number], CSN: [id number]      : 1960   Age: 63 y.o. female  Payor: [facility] [place] SHIELD / Plan: BCBS ALL OUT OF STATE / Product Type: PPO /       Ref:  Steven Saab II, MD  [address]  [place]  [place] [id number]    PCP: Steven Saab II, MD    Visit Date: 2024     Patient Active Problem List   Diagnosis    Vitamin D deficiency    Appendicitis       SUBJECTIVE:      CC:   Chief Complaint   Patient presents with    neck and low back pain, with no pain today       HPI:   Ms. Ying is a 63 y.o. 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  She presents as a new patient in the neurosurgery clinic for chronic neck and low back pain, with no pain today.     The patient reports having episodic, chronic neck pain since she was in her 20s.  She describes recent episode of posterior and left-sided neck pain with radiation to her shoulder blades.  Ms. Ying has occasional radiating pain in her bilateral arms and episodic numbness in her bilateral hands.  The patient feels subjectively weak in her left arm and has dropped few objects in the past.      In regards to her low back pain, her symptoms have been bothersome for approximately 10 years.  Ms. Ying has intermittent pain in her lower back with radiation into her right-greater-than-left hips.  She does not have any radiating leg pain or weakness.  The patient describes numbness in her bilateral feet at times.  Approximately 2 months ago, Ms. Link developed painful numbness in her left foot at night while sleeping.  There was so much discomfort that she had difficulty falling asleep.  The patient was evaluated by a podiatrist with no significant recommendations.  The patient has been taking Neurontin, which has significantly improved the painful numbness in her left foot.   She has been fully ambulatory with no bowel or bladder incontinence.  Ms. Ying exercises on a daily basis, as staying active is the most effective means of reducing the pain along her spinal axis.  She either walks 3 miles daily, swims, or uses an elliptical .    There is increased pain when sedentary, especially with prolonged sitting and looking at a computer.  There is a reduction of pain with regular exercise.  In addition to Neurontin, she has also tried ibuprofen.  Ms. Ying has participated in chiropractic treatments in the past.  The patient has not participated in physical therapy.  She is established with pain management specialist Dr. Denton, who has not yet offered any injection treatments.      Patient Active Problem List    Diagnosis Date Noted    Appendicitis 06/14/2023    Vitamin D deficiency 03/09/2023     Past Medical History:   Diagnosis Date    Appendicitis, unspecified appendicitis type     Carcinoma in situ of skin of right upper limb, including shoulder     Central serous chorioretinopathy, bilateral     Cervicalgia     Muscle spasm of back     Neuralgia and neuritis, unspecified     Other cervical disc degeneration, unspecified cervical region     Other seborrheic keratosis     Pain in unspecified shoulder     Paresthesia of left leg     Segmental and somatic dysfunction of cervical region     Segmental and somatic dysfunction of lumbar region     Transient synovitis, left ankle and foot     Vertebrogenic low back pain      Past Surgical History:   Procedure Laterality Date    APPENDECTOMY      FISSURECTOMY      FOOT SURGERY      LAPAROSCOPIC APPENDECTOMY N/A 06/14/2023    Procedure: APPENDECTOMY, LAPAROSCOPIC;  Surgeon: Tommy Mesa MD;  Location: [facility];  Service: General;  Laterality: N/A;    OOPHORECTOMY         Current Outpatient Medications:     gabapentin (NEURONTIN) 100 MG capsule, Take 1 capsule (100 mg total) by mouth 2 (two) times daily., Disp: 60 capsule,  "Rfl: 11    Lactobacillus rhamnosus GG (CULTURELLE) 10 billion cell capsule, Take 1 capsule by mouth once daily., Disp: , Rfl:     magnesium citrate 100 mg Cap, Take by mouth., Disp: , Rfl:     omega-3 fatty acids/fish oil (FISH OIL-OMEGA-3 FATTY ACIDS) 300-1,000 mg capsule, Take by mouth once daily., Disp: , Rfl:     UNABLE TO FIND, Supplement for Eye Care, Disp: , Rfl:     vitamin D (VITAMIN D3) 1000 units Tab, Take by mouth once daily., Disp: , Rfl:     Review of patient's allergies indicates:  No Known Allergies    Social History     Tobacco Use    Smoking status: Former     Types: Cigarettes    Smokeless tobacco: Not on file   Substance Use Topics    Alcohol use: Yes     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   HENT:  Negative for congestion and sore throat.    Eyes:  Positive for blurred vision, Negative for double vision.   Respiratory:  Negative for cough and shortness of breath.    Cardiovascular:  Negative for chest pain and palpitations.   Gastrointestinal:  Negative for constipation, diarrhea, nausea and vomiting.   Musculoskeletal:  Positive for back pain and neck pain.   Neurological:  Positive for sensory change and focal weakness, Negative for headaches.   Endo/Heme/Allergies:  Does not bruise/bleed easily.   Psychiatric/Behavioral:  Positive for anxiety, Negative for depression.      OBJECTIVE:     EXAMINATION:  /78   Pulse 77   Resp 16   Ht 5' 6" (1.676 m)   Wt 73.8 kg (162 lb 9.6 oz)   BMI 26.24 kg/m²   Body Habitus: Normal    Physical Exam:  Constitutional: The patient is well-developed and cooperative, sitting comfortably in a chair    Mental Status:   Oriented to person, place, and time  Normal " speech    Cranial nerves:  CN II: PERRL, 3 to 2 mm, brisk bilaterally  CN III, IV, and VI: extraocular movements normal, no ptosis  CN V: normal facial sensation and masseter function  CN VII: facial strength normal and symmetrical  CN VIII: hearing normal bilaterally  CN IX and X: swallowing and phonation normal  CN XI: shoulder shrug intact bilaterally  CN XII: tongue protrusion midline    Motor:  Muscle bulk: normal in all extremities  Tone: normal in all extremities    Upper extremities:  Deltoid: right 5/5; left 5/5  Biceps: right 5/5; left 5/5  Triceps: right 5/5; left 5/5  Wrist extensors: right 5/5; left 5/5  Wrist flexors: right 5/5; left 5/5  : right 5/5; left 5/5  Interosseous muscles: right 5/5; left 5/5    Lower extremities:  Hip flexors: right 5/5; left 5/5  Knee extensors: right 5/5; left 5/5  Knee flexors: right 5/5; left 5/5  Foot dorsiflexors: right 5/5; left 5/5  Foot plantar flexors: right 5/5; left 5/5  Extensor hallucis longus: right 5/5; left 5/5    Sensation:  Normal to light touch x 4 extremities, except for decreased sensation to light touch in dorsum of left foot    Reflexes:  Biceps: right 2+/4; left 2+/4  Brachioradialis: right 2+/4; left 2+/4  Triceps: right 2+/4; left 2+/4  Knee: right 2+/4; left 2+/4  Ankle: right 0/4; left 0/4    Sorianos sign: right negative; left positive  Babinski: right downgoing; left downgoing  Clonus: right negative; left negative    Coordination:  Finger to nose: right normal; left normal    Musculoskeletal:    Gait:  Toe walk- normal  Heel walk- normal  Tandem gait- normal    Straight leg test: right negative; left negative    Cervical: No pain with palpation  Upper back: No pain with palpation  Lower back: No pain with palpation      DIAGNOSTICS REVIEW OF IMAGING, LAB & OTHER STUDIES:  I have personally reviewed and evaluated the following reports as well as radiographic studies:    MRI cervical spine without gadolinium, 12/30/2023- minimal anterior  spondylolisthesis of C3 on C4 and minimal retrolisthesis of C4 on C5.  At C3-4, there is degenerative disc disease with mild stenosis and left neuroforaminal narrowing.  At C4-5 and C5-6, there is degenerative disc disease with moderate stenosis and left neuroforaminal narrowing.  There is CSF visualized circumferentially the spinal cord.    MRI lumbar spine without gadolinium, 12/22/2023- mild levoscoliosis.  There is Grade I retrolisthesis of L2 on L3 measuring 3-4 mm.  There is fluid in the bilateral L2-3 facets with no significant neural compression in the lumbar spine.      ASSESSMENT:  Ms. Ying is a 63 y.o. 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  She presents as a new patient in the neurosurgery clinic for chronic neck and low back pain, with no pain today.  Ms. Ying has a normal motor exam with minimal numbness in the dorsum of her left foot.  She has a positive Soriano's reflex on the left with otherwise no signs of hyperreflexia.     I reviewed pertinent imaging studies with the patient.  A MRI cervical spine without gadolinium demonstrates minimal anterior spondylolisthesis of C3 on C4 and minimal retrolisthesis of C4 on C5.  At C3-4, there is degenerative disc disease with mild stenosis and left neuroforaminal narrowing.  At C4-5 and C5-6, there is degenerative disc disease with moderate stenosis and left neuroforaminal narrowing.  There is CSF visualized circumferentially the spinal cord.  A MRI lumbar spine without gadolinium demonstrates mild levoscoliosis.  There is Grade I retrolisthesis of L2 on L3 measuring 3-4 mm.  There is fluid in the bilateral L2-3 facets with no significant neural compression in the lumbar spine.        PLAN:    Encounter Diagnoses   Name Primary?    Cervical spondylosis     Spondylolisthesis of lumbar region Yes    Numbness of left foot     Chronic neck pain     Chronic bilateral low back pain  without sciatica      Orders Placed This Encounter   Procedures    X-Ray Cervical Spine 5 View W Flex Extxt    X-Ray Lumbar Complete Including Flex And Ext    CT Cervical Spine Without Contrast    Ambulatory referral/consult to Physical/Occupational Therapy        1.  I discussed with Ms. Ying that continued optimization of medical management is recommended for her chronic neck and low back pain.  However, if her neck pain symptoms progressively worsen, she may be a future candidate for surgery, specifically a C3-4 to C5-6 laminectomy and posterior fusion. The risks, benefits, and alternatives of this surgery are outlined below.  She is very agreeable to pursuing nonsurgical management at this time.    2.  To further evaluate her spinal anatomy, I am ordering cervical and lumbar spine x-rays with AP lateral and flexion-extension views.  In addition, I am ordering a CT cervical spine without contrast.  The patient will be contacted with these radiographic results and any updates to the plan of care.    3.  She is recommended to follow up with her primary care physician regarding the numbness symptoms in her left foot, which is most likely related to an underlying neuropathy condition with improvement on Neurontin.  I discussed with the patient that I do not feel that the numbness in her left foot is related to any of the degenerative findings in her lower back, as there is no significant neural compression on her MRI lumbar spine.    4.  I am referring Ms. Ying to physical therapy at [facility] on [address]    5.  She is recommended to hold off on chiropractic treatments due to the degenerative findings in her neck and lower back.    6.  The patient is encouraged to continue following up with pain management specialist Dr. Denton as scheduled.      7.  Arrangements are being made for the patient to follow up in the neurosurgery clinic with YINKA Vickers in 2 months.         The risks,  benefits, and alternatives to surgery were discussed with the patient.    Complications of a Posterior Cervical Laminectomy and Fusion may include:  Failure to improve symptoms and/or increased or persistent pain; Recurrence, continuation, or worsening of the condition that required the operation; Need for further surgical intervention or treatment; Neurological injury, which may include spinal cord or nerve root injury, paralysis (which involves the inability to move arms and/or legs), clumsiness, loss of sympathetic response, loss of sensation, and loss of bowel, bladder, and sexual function; Delayed or immediate spinal instability; Failure of hardware; Failure to fuse (increased risk with nicotine use); Theoretical risk of disease transmission from allograft material; Cerebral spinal fluid leak; Meningitis; Damage to major blood vessels, nerves, and surrounding anatomical structures; Scarring; Blindness; and Positioning problems such as neuropathy or compartment syndrome    Complications of any surgery may include:  Adverse reaction to anesthesia; Bleeding; Transfusion of blood products, which carries a risk of infection or reaction; Infection, which requires treatment with antibiotics by mouth or intravenously, or even further surgeries; Urinary tract infection; Heart attack, stroke, pneumonia, and DVT/PE (blood clot in the legs or pelvis that can dislodge and go to the lungs); Other unforeseen complications; Coma; and Death.    Benefits of surgery include:  Possible improvement in arm pain, numbness, and/or weakness; and possible improvement in neck pain.    Alternatives to the procedure include:    Physical therapy, chiropractic care, acupuncture, medical therapy, and pain management.       POSTERIOR CERVICAL FUSION  DISCHARGE INSTRUCTIONS      1.  Wear your cervical collar at all times.  Use the [place] J collar (blue color).  The lining of the collar can be removed and washed with soap and water.   When  showering, use the [place] collar (peach color).  Your collar will likely be discontinued after 8-12 weeks.      2.  Keep your incision clean and dry.    Your sutures will be removed at your first postoperative follow-up appointment in 10-14 days.   Place clean gauze and tape over your wound daily until your sutures are removed.  Wait at least 72 hours from the time of your surgery to take a shower.  After showering, pat your incision dry and replace the wound dressing.  Do not immerse your incision in water for 4-6 weeks (e.g. bath tub, hot tub, swimming pool).      3.  Activity restrictions:  No lifting greater than 15 pounds until your return appointment in 4-6 weeks.  No bending, stooping, or twisting.  No impact exercise or contact sports for at least 3 months.  No driving until your cervical collar is removed in 8-12 weeks.  To resume driving short distances (<30 minutes), you must be off of your narcotic medications and be able to comfortably brake suddenly, should the need arise.    Get up and walk.      4.  Contact your Neurosurgeon if the following occurs:  Signs and symptoms of infection, including fever above 101.5 degrees Fahrenheit and/or chills.  Redness, swelling, warmth, or drainage from the incision.  Any lasting changes in sensation, numbness, and/or tingling.  Increased weakness or increased pain.  Swelling of the foot and/or lower leg with calf pain.      Neurosurgery has office hours Monday through Friday, 8:00 AM to 4:00 PM except for holidays. There is an answering service available during non-office hours, with 24 hours neurosurgery coverage.  Report to the Emergency Department if you need immediate medical assistance.      Because you have had a fusion, you are not to take steroidal medications or non-steroidal anti-inflammatory (NSAID) medications such as Motrin/ Ibuprofen or Naprosyn/ Naproxen unless agreed upon with your neurosurgeon.      Please contact Dr. Mondragon's office for any  questions or concerns.  Typically, your first follow-up appointment after a posterior cervical fusion surgery is 10-14 days from the date of your operation.  At this time, sutures will be removed.  For your second postoperative appointment in 6-8 weeks, an x-ray of your cervical spine will be arranged in Radiology before your neurosurgery appointment.  You may be requested to remove your cervical collar for these flexion/ extension C-spine x-rays, and then replace the brace after your x-rays are completed.        This note will be sent to the patient's referring provider Dr. Steven Saab II and primary care provider Steven Saab II, MD.           Margaret Mondragon MD  Neurosurgeon

## 2024-01-04 ENCOUNTER — OFFICE VISIT (OUTPATIENT)
Dept: NEUROSURGERY | Facility: CLINIC | Age: 64
End: 2024-01-04
Payer: COMMERCIAL

## 2024-01-04 VITALS
SYSTOLIC BLOOD PRESSURE: 117 MMHG | WEIGHT: 162.63 LBS | RESPIRATION RATE: 16 BRPM | BODY MASS INDEX: 26.14 KG/M2 | HEIGHT: 66 IN | DIASTOLIC BLOOD PRESSURE: 78 MMHG | HEART RATE: 77 BPM

## 2024-01-04 DIAGNOSIS — M54.2 CHRONIC NECK PAIN: ICD-10-CM

## 2024-01-04 DIAGNOSIS — M54.50 CHRONIC BILATERAL LOW BACK PAIN WITHOUT SCIATICA: ICD-10-CM

## 2024-01-04 DIAGNOSIS — R20.0 NUMBNESS OF LEFT FOOT: ICD-10-CM

## 2024-01-04 DIAGNOSIS — M47.812 CERVICAL SPONDYLOSIS: ICD-10-CM

## 2024-01-04 DIAGNOSIS — G89.29 CHRONIC NECK PAIN: ICD-10-CM

## 2024-01-04 DIAGNOSIS — M43.16 SPONDYLOLISTHESIS OF LUMBAR REGION: Primary | ICD-10-CM

## 2024-01-04 DIAGNOSIS — G89.29 CHRONIC BILATERAL LOW BACK PAIN WITHOUT SCIATICA: ICD-10-CM

## 2024-01-04 PROCEDURE — 1160F RVW MEDS BY RX/DR IN RCRD: CPT | Mod: CPTII,,, | Performed by: NEUROLOGICAL SURGERY

## 2024-01-04 PROCEDURE — 99204 OFFICE O/P NEW MOD 45 MIN: CPT | Mod: ,,, | Performed by: NEUROLOGICAL SURGERY

## 2024-01-04 PROCEDURE — 3078F DIAST BP <80 MM HG: CPT | Mod: CPTII,,, | Performed by: NEUROLOGICAL SURGERY

## 2024-01-04 PROCEDURE — 3008F BODY MASS INDEX DOCD: CPT | Mod: CPTII,,, | Performed by: NEUROLOGICAL SURGERY

## 2024-01-04 PROCEDURE — 1159F MED LIST DOCD IN RCRD: CPT | Mod: CPTII,,, | Performed by: NEUROLOGICAL SURGERY

## 2024-01-04 PROCEDURE — 3074F SYST BP LT 130 MM HG: CPT | Mod: CPTII,,, | Performed by: NEUROLOGICAL SURGERY

## 2024-01-04 NOTE — PATIENT INSTRUCTIONS
Ms. Ying is a 63 y.o. 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  She presents as a new patient in the neurosurgery clinic for chronic neck and low back pain, with no pain today.  Ms. Ying has a normal motor exam with minimal numbness in the dorsum of her left foot.  She has a positive Soriano's reflex on the left with otherwise no signs of hyperreflexia.     I reviewed pertinent imaging studies with the patient.  A MRI cervical spine without gadolinium demonstrates minimal anterior spondylolisthesis of C3 on C4 and minimal retrolisthesis of C4 on C5.  At C3-4, there is degenerative disc disease with mild stenosis and left neuroforaminal narrowing.  At C4-5 and C5-6, there is degenerative disc disease with moderate stenosis and left neuroforaminal narrowing.  There is CSF visualized circumferentially the spinal cord.  A MRI lumbar spine without gadolinium demonstrates mild levoscoliosis.  There is Grade I retrolisthesis of L2 on L3 measuring 3-4 mm.  There is fluid in the bilateral L2-3 facets with no significant neural compression in the lumbar spine.        PLAN:    Encounter Diagnoses   Name Primary?    Cervical spondylosis     Spondylolisthesis of lumbar region Yes    Numbness of left foot     Chronic neck pain     Chronic bilateral low back pain without sciatica      Orders Placed This Encounter   Procedures    X-Ray Cervical Spine 5 View W Flex Extxt    X-Ray Lumbar Complete Including Flex And Ext    CT Cervical Spine Without Contrast    Ambulatory referral/consult to Physical/Occupational Therapy        1.  I discussed with Ms. Ying that continued optimization of medical management is recommended for her chronic neck and low back pain.  However, if her neck pain symptoms progressively worsen, she may be a future candidate for surgery, specifically a C3-4 to C5-6 laminectomy and posterior fusion. The risks, benefits, and  alternatives of this surgery are outlined below.  She is very agreeable to pursuing nonsurgical management at this time.    2.  To further evaluate her spinal anatomy, I am ordering cervical and lumbar spine x-rays with AP lateral and flexion-extension views.  In addition, I am ordering a CT cervical spine without contrast.  The patient will be contacted with these radiographic results and any updates to the plan of care.    3.  She is recommended to follow up with her primary care physician regarding the numbness symptoms in her left foot, which is most likely related to an underlying neuropathy condition with improvement on Neurontin.  I discussed with the patient that I do not feel that the numbness in her left foot is related to any of the degenerative findings in her lower back, as there is no significant neural compression on her MRI lumbar spine.    4.  I am referring Ms. Ying to physical therapy at [facility] on [address]    5.  She is recommended to hold off on chiropractic treatments due to the degenerative findings in her neck and lower back.    6.  The patient is encouraged to continue following up with pain management specialist Dr. Denton as scheduled.      7.  Arrangements are being made for the patient to follow up in the neurosurgery clinic with YINKA Vickers in 2 months.         The risks, benefits, and alternatives to surgery were discussed with the patient.    Complications of a Posterior Cervical Laminectomy and Fusion may include:  Failure to improve symptoms and/or increased or persistent pain; Recurrence, continuation, or worsening of the condition that required the operation; Need for further surgical intervention or treatment; Neurological injury, which may include spinal cord or nerve root injury, paralysis (which involves the inability to move arms and/or legs), clumsiness, loss of sympathetic response, loss of sensation, and loss of bowel, bladder, and sexual function;  Delayed or immediate spinal instability; Failure of hardware; Failure to fuse (increased risk with nicotine use); Theoretical risk of disease transmission from allograft material; Cerebral spinal fluid leak; Meningitis; Damage to major blood vessels, nerves, and surrounding anatomical structures; Scarring; Blindness; and Positioning problems such as neuropathy or compartment syndrome    Complications of any surgery may include:  Adverse reaction to anesthesia; Bleeding; Transfusion of blood products, which carries a risk of infection or reaction; Infection, which requires treatment with antibiotics by mouth or intravenously, or even further surgeries; Urinary tract infection; Heart attack, stroke, pneumonia, and DVT/PE (blood clot in the legs or pelvis that can dislodge and go to the lungs); Other unforeseen complications; Coma; and Death.    Benefits of surgery include:  Possible improvement in arm pain, numbness, and/or weakness; and possible improvement in neck pain.    Alternatives to the procedure include:    Physical therapy, chiropractic care, acupuncture, medical therapy, and pain management.       POSTERIOR CERVICAL FUSION  DISCHARGE INSTRUCTIONS      1.  Wear your cervical collar at all times.  Use the [place] J collar (blue color).  The lining of the collar can be removed and washed with soap and water.   When showering, use the [place] collar (peach color).  Your collar will likely be discontinued after 8-12 weeks.      2.  Keep your incision clean and dry.    Your sutures will be removed at your first postoperative follow-up appointment in 10-14 days.   Place clean gauze and tape over your wound daily until your sutures are removed.  Wait at least 72 hours from the time of your surgery to take a shower.  After showering, pat your incision dry and replace the wound dressing.  Do not immerse your incision in water for 4-6 weeks (e.g. bath tub, hot tub, swimming pool).      3.  Activity  restrictions:  No lifting greater than 15 pounds until your return appointment in 4-6 weeks.  No bending, stooping, or twisting.  No impact exercise or contact sports for at least 3 months.  No driving until your cervical collar is removed in 8-12 weeks.  To resume driving short distances (<30 minutes), you must be off of your narcotic medications and be able to comfortably brake suddenly, should the need arise.    Get up and walk.      4.  Contact your Neurosurgeon if the following occurs:  Signs and symptoms of infection, including fever above 101.5 degrees Fahrenheit and/or chills.  Redness, swelling, warmth, or drainage from the incision.  Any lasting changes in sensation, numbness, and/or tingling.  Increased weakness or increased pain.  Swelling of the foot and/or lower leg with calf pain.      Neurosurgery has office hours Monday through Friday, 8:00 AM to 4:00 PM except for holidays. There is an answering service available during non-office hours, with 24 hours neurosurgery coverage.  Report to the Emergency Department if you need immediate medical assistance.      Because you have had a fusion, you are not to take steroidal medications or non-steroidal anti-inflammatory (NSAID) medications such as Motrin/ Ibuprofen or Naprosyn/ Naproxen unless agreed upon with your neurosurgeon.      Please contact Dr. Mondragon's office for any questions or concerns.  Typically, your first follow-up appointment after a posterior cervical fusion surgery is 10-14 days from the date of your operation.  At this time, sutures will be removed.  For your second postoperative appointment in 6-8 weeks, an x-ray of your cervical spine will be arranged in Radiology before your neurosurgery appointment.  You may be requested to remove your cervical collar for these flexion/ extension C-spine x-rays, and then replace the brace after your x-rays are completed.        This note will be sent to the patient's referring provider Dr. Steven JALLOH  [facility] and primary care provider Steven Saab II, MD.

## 2024-01-05 ENCOUNTER — PATIENT MESSAGE (OUTPATIENT)
Dept: NEUROSURGERY | Facility: CLINIC | Age: 64
End: 2024-01-05
Payer: COMMERCIAL

## 2024-01-05 ENCOUNTER — TELEPHONE (OUTPATIENT)
Dept: NEUROSURGERY | Facility: CLINIC | Age: 64
End: 2024-01-05
Payer: COMMERCIAL

## 2024-01-05 DIAGNOSIS — M47.812 CERVICAL SPONDYLOSIS: Primary | ICD-10-CM

## 2024-01-05 DIAGNOSIS — G89.29 CHRONIC NECK PAIN: ICD-10-CM

## 2024-01-05 DIAGNOSIS — M54.2 CHRONIC NECK PAIN: ICD-10-CM

## 2024-01-05 DIAGNOSIS — R20.0 NUMBNESS OF LEFT FOOT: ICD-10-CM

## 2024-01-05 DIAGNOSIS — M54.50 CHRONIC BILATERAL LOW BACK PAIN WITHOUT SCIATICA: ICD-10-CM

## 2024-01-05 DIAGNOSIS — M43.16 SPONDYLOLISTHESIS OF LUMBAR REGION: ICD-10-CM

## 2024-01-05 DIAGNOSIS — G89.29 CHRONIC BILATERAL LOW BACK PAIN WITHOUT SCIATICA: ICD-10-CM

## 2024-01-05 NOTE — TELEPHONE ENCOUNTER
I put in the referral to Dr. Lujan per the patients request. I did verify with Minerva that it is okay to do so since she is already seeing a pain management doctor.

## 2024-01-08 ENCOUNTER — TELEPHONE (OUTPATIENT)
Dept: NEUROSURGERY | Facility: CLINIC | Age: 64
End: 2024-01-08
Payer: COMMERCIAL

## 2024-01-08 NOTE — TELEPHONE ENCOUNTER
Please send it back to me whenever you are able to speak w her so I can r/s her appointment. Thanks!

## 2024-01-08 NOTE — TELEPHONE ENCOUNTER
ROGERS asking patient to return my call regarding the instability in the cervical spine seen on recent imaging as well as Dr. Mondragon's recommendation for surgery at this time.

## 2024-01-08 NOTE — TELEPHONE ENCOUNTER
I am requesting YINKA Vickers to contact Ms. Ying.  I reviewed the patient's updated imaging studies that were completed on 01/08/2024.      Cervical spine x-rays demonstrate multilevel degenerative disc disease.  There is minimal anterolisthesis of C3 on C4.  In addition, there is retrolisthesis of C4 on C5 that measures 4 mm on extension and has normal alignment on flexion.    A CT cervical spine without contrast demonstrates multilevel degenerative disc disease, especially at C3-4, C4-5, and C5-6.  There is Grade I anterior spondylolisthesis of C3 on C4 measuring 3 mm with bilateral neuroforaminal narrowing.  There is left-greater-than-right neuroforaminal narrowing at C4-5 and C5-6.  At C6-7, there is bilateral neuroforaminal narrowing.      Lumbar spine x-rays demonstrate moderate levoscoliosis with multilevel degenerative disc disease.  There is Grade I retrolisthesis of L2 on L3 with no motion on flexion-extension views.      With these updated radiographic results, my recommendations are as follows:    1.  With 4 mm of unstable movement that is seen between flexion-extension views at the C4-5 level on these updated cervical spine x-rays, surgical intervention for her neck pain is now recommended.  This surgery would involve decompression of neural elements as well as biomechanical stabilization.  Ms. Ying is considered a candidate for surgery, specifically C3-4 to C6-7 laminectomy and posterior fusion.      2.  She is recommended to follow up with her primary care physician regarding the numbness symptoms in her left foot, which is most likely related to an underlying neuropathy condition with improvement on Neurontin.     3.  I would like to confirm that Ms. Ying has initiated physical therapy at [facility] on [address]     4.  The patient requested a referral to pain management specialist Dr. Lujan.  I would like to confirm that arrangements are being made for her to  establish care in her clinic.     5.  Because the plan of care now involves a surgical treatment option, I would like the patient's follow up visit in the neurosurgery clinic with YINKA Vickers on 03/04/2024 to be canceled.  Instead, I would like the patient to follow up in my neurosurgery clinic as soon as possible for a preoperative evaluation.  She is recommended to bring a family member to this appointment, as proposed surgery will be discussed.

## 2024-01-09 ENCOUNTER — TELEPHONE (OUTPATIENT)
Dept: NEUROSURGERY | Facility: CLINIC | Age: 64
End: 2024-01-09
Payer: COMMERCIAL

## 2024-01-09 NOTE — TELEPHONE ENCOUNTER
I had a cancellation for 1/16/24 at 4:00. I spoke with the patient and offered her to come in that day. She stated that will work out. I r/s her appointment to 1/16/24 at 4:00. She was compliant w date and time.

## 2024-01-09 NOTE — TELEPHONE ENCOUNTER
Spoke with patient regarding Dr. Mondragon' recommendations. Advised her Lisa will be calling her to schedule a visit ASAP with Dr. Mondragon. She verbalized understanding. She was encouraged to call back with any question sor concerns prior to further consultation with Dr. Mondragon.

## 2024-01-09 NOTE — TELEPHONE ENCOUNTER
I spoke with the patient to get her upcoming r/s to a sooner availability per Dr. Mondragon. I got her appointment r/s for 1/18/24 at 11:30. The patient was compliant w date and time. I also asked her if she has heard from [facility] regarding an appointment for her physical therapy. She stated she is scheduled to go for her initial eval on 1/11/24. I will obtain the PT notes prior to the upcoming appointment. I did also advise the patient to have a family member come with her to the upcoming appointment as surgery will be discussed. She verbalized understanding.

## 2024-01-10 NOTE — TELEPHONE ENCOUNTER
I have a cancellation for tomorrow, 1/11/24 at 10:00. I called the patient to see if she would like to come in earlier than her scheduled appointment. She stated she would really like to but her  also has health issues and is finally going see an MD for this; they have been waiting a long time for this appointment. She will not be able to come in at 10:00 but did advise me if Dr. Mondragon does have a later appointment time that day that does come up, to give her a call. I advised her we will keep her appointment as scheduled for 1/16/24 at 4:00.

## 2024-01-16 ENCOUNTER — OFFICE VISIT (OUTPATIENT)
Dept: NEUROSURGERY | Facility: CLINIC | Age: 64
End: 2024-01-16
Payer: COMMERCIAL

## 2024-01-16 VITALS
WEIGHT: 165.63 LBS | HEIGHT: 66 IN | HEART RATE: 66 BPM | SYSTOLIC BLOOD PRESSURE: 127 MMHG | BODY MASS INDEX: 26.62 KG/M2 | DIASTOLIC BLOOD PRESSURE: 73 MMHG | RESPIRATION RATE: 16 BRPM

## 2024-01-16 DIAGNOSIS — M53.2X2 UNSTABLE CERVICAL SPINE: Primary | ICD-10-CM

## 2024-01-16 DIAGNOSIS — M54.12 LEFT CERVICAL RADICULOPATHY: ICD-10-CM

## 2024-01-16 DIAGNOSIS — G89.29 CHRONIC BILATERAL LOW BACK PAIN WITHOUT SCIATICA: ICD-10-CM

## 2024-01-16 DIAGNOSIS — M54.50 CHRONIC BILATERAL LOW BACK PAIN WITHOUT SCIATICA: ICD-10-CM

## 2024-01-16 DIAGNOSIS — G89.29 NECK PAIN, CHRONIC: ICD-10-CM

## 2024-01-16 DIAGNOSIS — M54.2 NECK PAIN, CHRONIC: ICD-10-CM

## 2024-01-16 DIAGNOSIS — M43.12 SPONDYLOLISTHESIS, CERVICAL REGION: ICD-10-CM

## 2024-01-16 PROCEDURE — 1159F MED LIST DOCD IN RCRD: CPT | Mod: CPTII,,, | Performed by: NEUROLOGICAL SURGERY

## 2024-01-16 PROCEDURE — 3078F DIAST BP <80 MM HG: CPT | Mod: CPTII,,, | Performed by: NEUROLOGICAL SURGERY

## 2024-01-16 PROCEDURE — 99214 OFFICE O/P EST MOD 30 MIN: CPT | Mod: ,,, | Performed by: NEUROLOGICAL SURGERY

## 2024-01-16 PROCEDURE — 3008F BODY MASS INDEX DOCD: CPT | Mod: CPTII,,, | Performed by: NEUROLOGICAL SURGERY

## 2024-01-16 PROCEDURE — 1160F RVW MEDS BY RX/DR IN RCRD: CPT | Mod: CPTII,,, | Performed by: NEUROLOGICAL SURGERY

## 2024-01-16 PROCEDURE — 3074F SYST BP LT 130 MM HG: CPT | Mod: CPTII,,, | Performed by: NEUROLOGICAL SURGERY

## 2024-01-16 NOTE — PATIENT INSTRUCTIONS
Ms. Ying is a 63 y.o. 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  She presents as a follow up patient in the neurosurgery clinic for chronic neck and low back pain.  In the last 2 weeks, Ms. Ying has been experiencing progressively worsening pain down her left arm with numbness in her left fingers. The patient has a normal motor and sensory neurological exam.  She has a positive Soriano's reflex on the left with otherwise no signs of hyperreflexia.      I reviewed pertinent imaging studies with the patient and her .  Cervical spine x-rays demonstrate multilevel degenerative disc disease.  There is minimal anterolisthesis of C3 on C4.  In addition, there is retrolisthesis of C4 on C5 that measures 4 mm on extension with normal alignment on flexion.  A MRI cervical spine without gadolinium demonstrates minimal anterior spondylolisthesis of C3 on C4 and minimal retrolisthesis of C4 on C5.  At C3-4, there is degenerative disc disease with mild stenosis and left neuroforaminal narrowing.  At C4-5 and C5-6, there is degenerative disc disease with moderate stenosis and left neuroforaminal narrowing.  There is CSF visualized circumferentially the spinal cord.   A MRI lumbar spine without gadolinium demonstrates mild levoscoliosis.  There is Grade I retrolisthesis of L2 on L3 measuring 3-4 mm.  There is fluid in the bilateral L2-3 facets with no significant neural compression in the lumbar spine.        PLAN:    Encounter Diagnoses   Name Primary?    Unstable cervical spine Yes    Spondylolisthesis, cervical region     Neck pain, chronic     Left cervical radiculopathy     Chronic bilateral low back pain without sciatica      No orders of the defined types were placed in this encounter.       1.  I discussed with the patient and her  that with 4 mm of unstable movement that is seen between flexion-extension views at the  C4-5 level on these updated cervical spine x-rays, surgical intervention for her neck pain is now recommended.  This surgery would involve decompression of neural elements as well as biomechanical stabilization.  Ms. Ying is considered a candidate for surgery, specifically C3-4 to C6-7 laminectomy and posterior fusion.  I reviewed the risks, benefits, and alternatives of this surgery.  She agrees to proceed.  The patient had a multitude of questions, all which were answered to her satisfaction.     2.  Ms. Ying's C3-4 to C6-7 laminectomy and posterior fusion is being scheduled for Wednesday, 03/06/2024 at 7:30 AM.    3.  The patient needs to discontinue taking Omega 3 for 10 days preoperatively.    4.  She had requested a referral to pain management specialist Dr. Lujan and has already been established with Dr. Denton.  Any pain management procedures will need to be scheduled after completion of her cervical spine surgery.    5.  Arrangements are being made for Ms. Ying to follow up in the neurosurgery clinic for a preoperative evaluation with YINKA Vickers in approximately 6 weeks.         The risks, benefits, and alternatives to surgery were discussed with the patient.     Complications of a Posterior Cervical Laminectomy and Fusion may include:  Failure to improve symptoms and/or increased or persistent pain; Recurrence, continuation, or worsening of the condition that required the operation; Need for further surgical intervention or treatment; Neurological injury, which may include spinal cord or nerve root injury, paralysis (which involves the inability to move arms and/or legs), clumsiness, loss of sympathetic response, loss of sensation, and loss of bowel, bladder, and sexual function; Delayed or immediate spinal instability; Failure of hardware; Failure to fuse (increased risk with nicotine use); Theoretical risk of disease transmission from allograft material; Cerebral spinal fluid  leak; Meningitis; Damage to major blood vessels, nerves, and surrounding anatomical structures; Scarring; Blindness; and Positioning problems such as neuropathy or compartment syndrome     Complications of any surgery may include:  Adverse reaction to anesthesia; Bleeding; Transfusion of blood products, which carries a risk of infection or reaction; Infection, which requires treatment with antibiotics by mouth or intravenously, or even further surgeries; Urinary tract infection; Heart attack, stroke, pneumonia, and DVT/PE (blood clot in the legs or pelvis that can dislodge and go to the lungs); Other unforeseen complications; Coma; and Death.     Benefits of surgery include:  Possible improvement in arm pain, numbness, and/or weakness; and possible improvement in neck pain.     Alternatives to the procedure include:              Physical therapy, chiropractic care, acupuncture, medical therapy, and pain management.         POSTERIOR CERVICAL FUSION  DISCHARGE INSTRUCTIONS        1.  Wear your cervical collar at all times.  Use the [place] J collar (blue color).  The lining of the collar can be removed and washed with soap and water.   When showering, use the [place] collar (peach color).  Your collar will likely be discontinued after 8-12 weeks.        2.  Keep your incision clean and dry.    Your sutures will be removed at your first postoperative follow-up appointment in 10-14 days.   Place clean gauze and tape over your wound daily until your sutures are removed.  Wait at least 72 hours from the time of your surgery to take a shower.  After showering, pat your incision dry and replace the wound dressing.  Do not immerse your incision in water for 4-6 weeks (e.g. bath tub, hot tub, swimming pool).        3.  Activity restrictions:  No lifting greater than 15 pounds until your return appointment in 4-6 weeks.  No bending, stooping, or twisting.  No impact exercise or contact sports for at least 3 months.  No  driving until your cervical collar is removed in 8-12 weeks.  To resume driving short distances (<30 minutes), you must be off of your narcotic medications and be able to comfortably brake suddenly, should the need arise.    Get up and walk.        4.  Contact your Neurosurgeon if the following occurs:  Signs and symptoms of infection, including fever above 101.5 degrees Fahrenheit and/or chills.  Redness, swelling, warmth, or drainage from the incision.  Any lasting changes in sensation, numbness, and/or tingling.  Increased weakness or increased pain.  Swelling of the foot and/or lower leg with calf pain.        Neurosurgery has office hours Monday through Friday, 8:00 AM to 4:00 PM except for holidays. There is an answering service available during non-office hours, with 24 hours neurosurgery coverage.  Report to the Emergency Department if you need immediate medical assistance.       Because you have had a fusion, you are not to take steroidal medications or non-steroidal anti-inflammatory (NSAID) medications such as Motrin/ Ibuprofen or Naprosyn/ Naproxen unless agreed upon with your neurosurgeon.       Please contact Dr. Mondragon's office for any questions or concerns.  Typically, your first follow-up appointment after a posterior cervical fusion surgery is 10-14 days from the date of your operation.  At this time, sutures will be removed.  For your second postoperative appointment in 6-8 weeks, an x-ray of your cervical spine will be arranged in Radiology before your neurosurgery appointment.  You may be requested to remove your cervical collar for these flexion/ extension C-spine x-rays, and then replace the brace after your x-rays are completed.      This note will be sent to the patient's referring provider No ref. provider found and primary care provider Steven Saab II, MD.

## 2024-01-16 NOTE — PROGRESS NOTES
[facility]   Neurosurgery      Shira Ying  MRN: [id number], CSN: [id number]      : 1960   Age: 63 y.o. female  Payor: [facility] BLUE SHIELD / Plan: BCBS ALL OUT OF STATE / Product Type: PPO /       Ref:  No referring provider defined for this encounter.    PCP: Steven Saab II, MD    Visit Date: 2024     Patient Active Problem List   Diagnosis    Vitamin D deficiency    Appendicitis       SUBJECTIVE:      CC:   Chief Complaint   Patient presents with    chronic neck pain with L arm numbness, chronic back pain       HPI:   Ms. Ying is a 63 y.o. 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  She presents as a follow up patient in the neurosurgery clinic for chronic neck and low back pain.  In the last 2 weeks, Ms. Ying has been experiencing progressively worsening pain down her left arm with numbness in her left fingers.    The patient's last date of visit in my neurosurgery clinic was 2024.  The plan of care was to continue optimization of medical management for her chronic neck and low back pain.  However, if her neck pain symptoms progressively worsened, I discussed that she may be considered a future candidate for surgery, specifically a cervical laminectomy and posterior fusion.  Imaging studies that included cervical and lumbar spine x-rays along with a CT cervical spine without contrast were ordered.  Ms. Ying was contacted with her radiographic results and requested to follow up in the neurosurgery clinic for radiographic abnormalities affecting her cervical spine.     In addition, the patient was recommended to follow up with her primary care physician regarding the numbness symptoms in her left foot, which was most likely related to an underlying neuropathy condition with improvement on Neurontin.  I discussed with the patient that I did not feel that the  numbness in her left foot was related to any of the degenerative findings in her lower back, as there is was not significant neural compression on her MRI lumbar spine.  Since her last visit, Ms. Ying completed an MRI of her left lower leg with and without contrast, which was ordered by her primary care physician Dr. Hsu.  This MRI was negative for any significant abnormalities.  She was referred to physical therapy at [facility] on [facility].  The patient was evaluated in physical therapy and instructed by her physical therapist to hold off on treatment due to her abnormal radiographic findings.  At her last visit, I recommended for Ms. Ying to discontinue chiropractic treatments due to the degenerative findings in her neck and lower back.  Finally, she was encouraged to continue following up with pain management specialist Dr. Denton as scheduled.  After her evaluation, the patient requested a referral to pain management specialist Dr. Lujan, which was submitted.     Ms. Ying visits the neurosurgery clinic with her . She is relatively anxious and brings a list questions to her appointment.  Since last visit, the patient has been experiencing progressively worsening pain down her left arm for the last 2 weeks.  There is numbness in her left 3rd and 4th digits.  The patient continues to exercise on a regular basis, which effectively maintains her chronic neck and low back pain at manageable levels.  Ms. Ying continues to be fully ambulatory with no bowel or bladder incontinence.      Reviewed from the patient's last date of visit on 01/04/2024:  The patient reports having episodic, chronic neck pain since she was in her 20s.  She describes recent episode of posterior and left-sided neck pain with radiation to her shoulder blades.  Ms. Ying has occasional radiating pain in her bilateral arms and episodic numbness in her bilateral hands.  The patient feels subjectively weak in  her left arm and has dropped few objects in the past.       In regards to her low back pain, her symptoms have been bothersome for approximately 10 years.  Ms. Ying has intermittent pain in her lower back with radiation into her right-greater-than-left hips.  She does not have any radiating leg pain or weakness.  The patient describes numbness in her bilateral feet at times.  Approximately 2 months ago, Ms. Link developed painful numbness in her left foot at night while sleeping.  There was so much discomfort that she had difficulty falling asleep. The patient was evaluated by a podiatrist with no significant recommendations.  The patient has been taking Neurontin, which has significantly improved the painful numbness in her left foot.  She has been fully ambulatory with no bowel or bladder incontinence.  Ms. Ying exercises on a daily basis, as staying active is the most effective means of reducing the pain along her spinal axis.  She either walks 3 miles daily, swims, or uses an elliptical .     There is increased pain when sedentary, especially with prolonged sitting and looking at a computer.  There is a reduction of pain with regular exercise.  In addition to Neurontin, she has also tried ibuprofen.       Patient Active Problem List    Diagnosis Date Noted    Appendicitis 06/14/2023    Vitamin D deficiency 03/09/2023     Past Medical History:   Diagnosis Date    Appendicitis, unspecified appendicitis type     Carcinoma in situ of skin of right upper limb, including shoulder     Central serous chorioretinopathy, bilateral     Cervicalgia     Muscle spasm of back     Neuralgia and neuritis, unspecified     Other cervical disc degeneration, unspecified cervical region     Other seborrheic keratosis     Pain in unspecified shoulder     Paresthesia of left leg     Segmental and somatic dysfunction of cervical region     Segmental and somatic dysfunction of lumbar region     Transient  synovitis, left ankle and foot     Vertebrogenic low back pain      Past Surgical History:   Procedure Laterality Date    APPENDECTOMY      FISSURECTOMY      FOOT SURGERY      LAPAROSCOPIC APPENDECTOMY N/A 06/14/2023    Procedure: APPENDECTOMY, LAPAROSCOPIC;  Surgeon: Tommy Mesa MD;  Location: [facility];  Service: General;  Laterality: N/A;    OOPHORECTOMY         Current Outpatient Medications:     gabapentin (NEURONTIN) 100 MG capsule, Take 1 capsule (100 mg total) by mouth 2 (two) times daily., Disp: 60 capsule, Rfl: 11    Lactobacillus rhamnosus GG (CULTURELLE) 10 billion cell capsule, Take 1 capsule by mouth once daily., Disp: , Rfl:     magnesium citrate 100 mg Cap, Take by mouth., Disp: , Rfl:     omega-3 fatty acids/fish oil (FISH OIL-OMEGA-3 FATTY ACIDS) 300-1,000 mg capsule, Take by mouth once daily., Disp: , Rfl:     UNABLE TO FIND, Supplement for Eye Care, Disp: , Rfl:     vitamin D (VITAMIN D3) 1000 units Tab, Take by mouth once daily., Disp: , Rfl:     Review of patient's allergies indicates:  No Known Allergies    Social History     Tobacco Use    Smoking status: Former     Types: Cigarettes    Smokeless tobacco: Not on file   Substance Use Topics    Alcohol use: Yes     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   HENT:  Negative for congestion and sore throat.    Eyes:  Positive for blurred vision, Negative for double vision.   Respiratory:  Negative for cough and shortness of breath.    Cardiovascular:  Negative for chest pain and palpitations.   Gastrointestinal:  Negative for constipation, diarrhea, nausea and vomiting.   Musculoskeletal:  Positive for back pain and neck pain.   Neurological:  Positive for sensory change  "and focal weakness, Negative for headaches.   Endo/Heme/Allergies:  Does not bruise/bleed easily.   Psychiatric/Behavioral:  Positive for anxiety, Negative for depression.      OBJECTIVE:     EXAMINATION:  /73   Pulse 66   Resp 16   Ht 5' 6" (1.676 m)   Wt 75.1 kg (165 lb 9.6 oz)   BMI 26.73 kg/m²   Body Habitus: Normal    Physical Exam:  Constitutional: The patient is well-developed and cooperative with anxiety, sitting comfortably in a chair     Mental Status:   Oriented to person, place, and time  Normal speech      Motor:  Muscle bulk: normal in all extremities  Tone: normal in all extremities     Upper extremities:  Deltoid: right 5/5; left 5/5  Biceps: right 5/5; left 5/5  Triceps: right 5/5; left 5/5  Wrist extensors: right 5/5; left 5/5  Wrist flexors: right 5/5; left 5/5  : right 5/5; left 5/5  Interosseous muscles: right 5/5; left 5/5     Lower extremities:  Hip flexors: right 5/5; left 5/5  Knee extensors: right 5/5; left 5/5  Knee flexors: right 5/5; left 5/5  Foot dorsiflexors: right 5/5; left 5/5  Foot plantar flexors: right 5/5; left 5/5  Extensor hallucis longus: right 5/5; left 5/5     Sensation:  Normal to light touch x 4 extremities     Reflexes:  Sorianos sign: right negative; left positive  Babinski: right downgoing; left downgoing  Clonus: right negative; left negative     Musculoskeletal:     Gait: normal      Straight leg test: right negative; left negative     Cervical: No pain with palpation  Upper back: No pain with palpation  Lower back: No pain with palpation        DIAGNOSTICS REVIEW OF IMAGING, LAB & OTHER STUDIES:  I have personally reviewed and evaluated the following reports as well as radiographic studies that were completed on 01/08/2024:     Cervical spine x-rays- multilevel degenerative disc disease.  There is minimal anterolisthesis of C3 on C4.  In addition, there is retrolisthesis of C4 on C5 that measures 4 mm on extension with normal alignment on flexion.   "   CT cervical spine without contrast- multilevel degenerative disc disease, especially at C3-4, C4-5, and C5-6.  There is Grade I anterior spondylolisthesis of C3 on C4 measuring 3 mm with bilateral neuroforaminal narrowing.  There is left-greater-than-right neuroforaminal narrowing at C4-5 and C5-6.  At C6-7, there is bilateral neuroforaminal narrowing.     Lumbar spine x-rays- moderate levoscoliosis with multilevel degenerative disc disease.  There is Grade I retrolisthesis of L2 on L3 with no motion on flexion-extension views.       MRI cervical spine without gadolinium, 12/30/2023- minimal anterior spondylolisthesis of C3 on C4 and minimal retrolisthesis of C4 on C5.  At C3-4, there is degenerative disc disease with mild stenosis and left neuroforaminal narrowing.  At C4-5 and C5-6, there is degenerative disc disease with moderate stenosis and left neuroforaminal narrowing.  There is CSF visualized circumferentially the spinal cord.     MRI lumbar spine without gadolinium, 12/22/2023- mild levoscoliosis.  There is Grade I retrolisthesis of L2 on L3 measuring 3-4 mm.  There is fluid in the bilateral L2-3 facets with no significant neural compression in the lumbar spine.      ASSESSMENT:  Ms. Ying is a 63 y.o. 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  She presents as a follow up patient in the neurosurgery clinic for chronic neck and low back pain.  In the last 2 weeks, Ms. Ying has been experiencing progressively worsening pain down her left arm with numbness in her left fingers. The patient has a normal motor and sensory neurological exam.  She has a positive Soriano's reflex on the left with otherwise no signs of hyperreflexia.      I reviewed pertinent imaging studies with the patient and her .  Cervical spine x-rays demonstrate multilevel degenerative disc disease.  There is minimal anterolisthesis of C3 on C4.   In addition, there is retrolisthesis of C4 on C5 that measures 4 mm on extension with normal alignment on flexion.  A MRI cervical spine without gadolinium demonstrates minimal anterior spondylolisthesis of C3 on C4 and minimal retrolisthesis of C4 on C5.  At C3-4, there is degenerative disc disease with mild stenosis and left neuroforaminal narrowing.  At C4-5 and C5-6, there is degenerative disc disease with moderate stenosis and left neuroforaminal narrowing.  There is CSF visualized circumferentially the spinal cord.   A MRI lumbar spine without gadolinium demonstrates mild levoscoliosis.  There is Grade I retrolisthesis of L2 on L3 measuring 3-4 mm.  There is fluid in the bilateral L2-3 facets with no significant neural compression in the lumbar spine.        PLAN:    Encounter Diagnoses   Name Primary?    Unstable cervical spine Yes    Spondylolisthesis, cervical region     Neck pain, chronic     Left cervical radiculopathy     Chronic bilateral low back pain without sciatica      No orders of the defined types were placed in this encounter.       1.  I discussed with the patient and her  that with 4 mm of unstable movement that is seen between flexion-extension views at the C4-5 level on these updated cervical spine x-rays, surgical intervention for her neck pain is now recommended.  This surgery would involve decompression of neural elements as well as biomechanical stabilization.  Ms. Ying is considered a candidate for surgery, specifically C3-4 to C6-7 laminectomy and posterior fusion.  I reviewed the risks, benefits, and alternatives of this surgery.  She agrees to proceed.  The patient had a multitude of questions, all which were answered to her satisfaction.     2.  Ms. Ying's C3-4 to C6-7 laminectomy and posterior fusion is being scheduled for Wednesday, 03/06/2024 at 7:30 AM.    3.  The patient needs to discontinue taking Omega 3 for 10 days preoperatively.    4.  She had  requested a referral to pain management specialist Dr. Lujan and has already been established with Dr. Denton.  Any pain management procedures will need to be scheduled after completion of her cervical spine surgery.    5.  Arrangements are being made for Ms. Ying to follow up in the neurosurgery clinic for a preoperative evaluation with YINKA Vickers in approximately 6 weeks.         The risks, benefits, and alternatives to surgery were discussed with the patient.     Complications of a Posterior Cervical Laminectomy and Fusion may include:  Failure to improve symptoms and/or increased or persistent pain; Recurrence, continuation, or worsening of the condition that required the operation; Need for further surgical intervention or treatment; Neurological injury, which may include spinal cord or nerve root injury, paralysis (which involves the inability to move arms and/or legs), clumsiness, loss of sympathetic response, loss of sensation, and loss of bowel, bladder, and sexual function; Delayed or immediate spinal instability; Failure of hardware; Failure to fuse (increased risk with nicotine use); Theoretical risk of disease transmission from allograft material; Cerebral spinal fluid leak; Meningitis; Damage to major blood vessels, nerves, and surrounding anatomical structures; Scarring; Blindness; and Positioning problems such as neuropathy or compartment syndrome     Complications of any surgery may include:  Adverse reaction to anesthesia; Bleeding; Transfusion of blood products, which carries a risk of infection or reaction; Infection, which requires treatment with antibiotics by mouth or intravenously, or even further surgeries; Urinary tract infection; Heart attack, stroke, pneumonia, and DVT/PE (blood clot in the legs or pelvis that can dislodge and go to the lungs); Other unforeseen complications; Coma; and Death.     Benefits of surgery include:  Possible improvement in arm pain, numbness, and/or  weakness; and possible improvement in neck pain.     Alternatives to the procedure include:              Physical therapy, chiropractic care, acupuncture, medical therapy, and pain management.         POSTERIOR CERVICAL FUSION  DISCHARGE INSTRUCTIONS        1.  Wear your cervical collar at all times.  Use the [place] J collar (blue color).  The lining of the collar can be removed and washed with soap and water.   When showering, use the [place] collar (peach color).  Your collar will likely be discontinued after 8-12 weeks.        2.  Keep your incision clean and dry.    Your sutures will be removed at your first postoperative follow-up appointment in 10-14 days.   Place clean gauze and tape over your wound daily until your sutures are removed.  Wait at least 72 hours from the time of your surgery to take a shower.  After showering, pat your incision dry and replace the wound dressing.  Do not immerse your incision in water for 4-6 weeks (e.g. bath tub, hot tub, swimming pool).        3.  Activity restrictions:  No lifting greater than 15 pounds until your return appointment in 4-6 weeks.  No bending, stooping, or twisting.  No impact exercise or contact sports for at least 3 months.  No driving until your cervical collar is removed in 8-12 weeks.  To resume driving short distances (<30 minutes), you must be off of your narcotic medications and be able to comfortably brake suddenly, should the need arise.    Get up and walk.        4.  Contact your Neurosurgeon if the following occurs:  Signs and symptoms of infection, including fever above 101.5 degrees Fahrenheit and/or chills.  Redness, swelling, warmth, or drainage from the incision.  Any lasting changes in sensation, numbness, and/or tingling.  Increased weakness or increased pain.  Swelling of the foot and/or lower leg with calf pain.        Neurosurgery has office hours Monday through Friday, 8:00 AM to 4:00 PM except for holidays. There is an answering  service available during non-office hours, with 24 hours neurosurgery coverage.  Report to the Emergency Department if you need immediate medical assistance.       Because you have had a fusion, you are not to take steroidal medications or non-steroidal anti-inflammatory (NSAID) medications such as Motrin/ Ibuprofen or Naprosyn/ Naproxen unless agreed upon with your neurosurgeon.       Please contact Dr. Mondragon's office for any questions or concerns.  Typically, your first follow-up appointment after a posterior cervical fusion surgery is 10-14 days from the date of your operation.  At this time, sutures will be removed.  For your second postoperative appointment in 6-8 weeks, an x-ray of your cervical spine will be arranged in Radiology before your neurosurgery appointment.  You may be requested to remove your cervical collar for these flexion/ extension C-spine x-rays, and then replace the brace after your x-rays are completed.      This note will be sent to the patient's referring provider No ref. provider found and primary care provider Stveen Saab II, MD.           Margaret Mondragon MD  Neurosurgeon

## 2024-01-19 ENCOUNTER — PATIENT MESSAGE (OUTPATIENT)
Dept: NEUROSURGERY | Facility: CLINIC | Age: 64
End: 2024-01-19
Payer: COMMERCIAL

## 2024-01-22 ENCOUNTER — TELEPHONE (OUTPATIENT)
Dept: NEUROSURGERY | Facility: CLINIC | Age: 64
End: 2024-01-22
Payer: COMMERCIAL

## 2024-01-22 ENCOUNTER — PATIENT MESSAGE (OUTPATIENT)
Dept: INTERNAL MEDICINE | Facility: CLINIC | Age: 64
End: 2024-01-22
Payer: COMMERCIAL

## 2024-01-22 NOTE — TELEPHONE ENCOUNTER
This information is acknowledged.  The patient's C3-4 to C6-7 laminectomy and posterior fusion that was scheduled for Wednesday, 03/06/2024 at 7:30 AM has now been cancelled per Ms. Ying's request.     I contacted the [employer] rep with this update and Minerva has already notified surgery scheduling.     I have cc'ed the patient's PMD and referring physician, Dr. Saab with this progress note.

## 2024-01-22 NOTE — TELEPHONE ENCOUNTER
"Patient sent the following message through the patient portal:    " I have decided to go in a different direction with my care. Please cancel my pre op and surgery appointments.   Thank you for working with me. "    Surgery and appointments have been canceled.   "

## 2024-01-30 PROBLEM — M50.30 OTHER CERVICAL DISC DEGENERATION, UNSPECIFIED CERVICAL REGION: Status: ACTIVE | Noted: 2024-01-30

## 2024-01-31 ENCOUNTER — OFFICE VISIT (OUTPATIENT)
Dept: INTERNAL MEDICINE | Facility: CLINIC | Age: 64
End: 2024-01-31
Payer: COMMERCIAL

## 2024-01-31 VITALS
SYSTOLIC BLOOD PRESSURE: 120 MMHG | BODY MASS INDEX: 26.03 KG/M2 | HEIGHT: 66 IN | DIASTOLIC BLOOD PRESSURE: 76 MMHG | RESPIRATION RATE: 16 BRPM | TEMPERATURE: 98 F | HEART RATE: 68 BPM | WEIGHT: 162 LBS | OXYGEN SATURATION: 99 %

## 2024-01-31 DIAGNOSIS — M50.30 OTHER CERVICAL DISC DEGENERATION, UNSPECIFIED CERVICAL REGION: Primary | ICD-10-CM

## 2024-01-31 PROCEDURE — 1159F MED LIST DOCD IN RCRD: CPT | Mod: CPTII,,, | Performed by: INTERNAL MEDICINE

## 2024-01-31 PROCEDURE — 99214 OFFICE O/P EST MOD 30 MIN: CPT | Mod: ,,, | Performed by: INTERNAL MEDICINE

## 2024-01-31 PROCEDURE — 3078F DIAST BP <80 MM HG: CPT | Mod: CPTII,,, | Performed by: INTERNAL MEDICINE

## 2024-01-31 PROCEDURE — 3008F BODY MASS INDEX DOCD: CPT | Mod: CPTII,,, | Performed by: INTERNAL MEDICINE

## 2024-01-31 PROCEDURE — 1160F RVW MEDS BY RX/DR IN RCRD: CPT | Mod: CPTII,,, | Performed by: INTERNAL MEDICINE

## 2024-01-31 PROCEDURE — 3074F SYST BP LT 130 MM HG: CPT | Mod: CPTII,,, | Performed by: INTERNAL MEDICINE

## 2024-01-31 RX ORDER — GABAPENTIN 300 MG/1
300 CAPSULE ORAL DAILY
COMMUNITY
Start: 2024-01-22 | End: 2024-03-18

## 2024-01-31 NOTE — PROGRESS NOTES
"Subjective:       Patient ID: Shira Ying is a 63 y.o. female.    Chief Complaint: Pre-op Exam    63-year-old female is here to discuss upcoming neck surgery.  She is having cervical spine surgery in [place] in 3 weeks.  She needs medical clearance.  She denies chest pain      Review of Systems   Constitutional:  Negative for fever.   HENT:  Negative for nosebleeds.    Eyes:  Negative for visual disturbance.   Respiratory:  Negative for shortness of breath.    Cardiovascular:  Negative for chest pain.   Gastrointestinal:  Negative for abdominal pain.   Genitourinary:  Negative for dysuria.   Musculoskeletal:  Positive for arthralgias, back pain and neck pain. Negative for gait problem.   Neurological:  Negative for headaches.   All other systems reviewed and are negative.        Objective:      Physical Exam  HENT:      Head: Normocephalic.      Mouth/Throat:      Pharynx: Oropharynx is clear.   Eyes:      Extraocular Movements: Extraocular movements intact.   Cardiovascular:      Rate and Rhythm: Normal rate and regular rhythm.   Pulmonary:      Breath sounds: Normal breath sounds.   Abdominal:      Palpations: Abdomen is soft.   Musculoskeletal:         General: No swelling.   Skin:     General: Skin is warm.   Neurological:      General: No focal deficit present.      Mental Status: She is alert and oriented to person, place, and time.   Psychiatric:         Mood and Affect: Mood normal.         Vitals:    01/31/24 1018   BP: 120/76   Pulse: 68   Resp: 16   Temp: 98 °F (36.7 °C)   SpO2: 99%   Weight: 73.5 kg (162 lb)   Height: 5' 6" (1.676 m)      Assessment:       Problem List Items Addressed This Visit          Neuro    Other cervical disc degeneration, unspecified cervical region - Primary       Medication List with Changes/Refills   Current Medications    GABAPENTIN (NEURONTIN) 300 MG CAPSULE    Take 300 mg by mouth Daily.    LACTOBACILLUS RHAMNOSUS GG (CULTURELLE) 10 BILLION CELL CAPSULE    Take " 1 capsule by mouth once daily.    MAGNESIUM CITRATE 100 MG CAP    Take by mouth.    OMEGA-3 FATTY ACIDS/FISH OIL (FISH OIL-OMEGA-3 FATTY ACIDS) 300-1,000 MG CAPSULE    Take by mouth once daily.    UNABLE TO FIND    Supplement for Eye Care    VITAMIN D (VITAMIN D3) 1000 UNITS TAB    Take by mouth once daily.   Discontinued Medications    GABAPENTIN (NEURONTIN) 100 MG CAPSULE    Take 1 capsule (100 mg total) by mouth 2 (two) times daily.        Plan:       Degenerative disc disease of the cervical spine:  She is having neck surgery with Dr. Dumas in [place] in 3 weeks.  She is cleared medically

## 2024-02-12 ENCOUNTER — TELEPHONE (OUTPATIENT)
Dept: INTERNAL MEDICINE | Facility: CLINIC | Age: 64
End: 2024-02-12
Payer: COMMERCIAL

## 2024-02-12 NOTE — TELEPHONE ENCOUNTER
----- Message from Andra Emerson sent at 2/12/2024  9:00 AM CST -----  .Type:  Patient Returning Call    Who Called:Danelle with Dr Dumas  Who Left Message for Patient:Danelle  Does the patient know what this is regarding?:Last office notes  Would the patient rather a call back or a response via [facility]?   Best Call Back Number:[phone]  Additional Information: Please send last office notes to [phone]

## 2024-03-13 ENCOUNTER — LAB VISIT (OUTPATIENT)
Dept: LAB | Facility: HOSPITAL | Age: 64
End: 2024-03-13
Attending: INTERNAL MEDICINE
Payer: COMMERCIAL

## 2024-03-13 DIAGNOSIS — Z00.00 WELLNESS EXAMINATION: ICD-10-CM

## 2024-03-13 DIAGNOSIS — Z12.31 VISIT FOR SCREENING MAMMOGRAM: ICD-10-CM

## 2024-03-13 DIAGNOSIS — E55.9 VITAMIN D DEFICIENCY: ICD-10-CM

## 2024-03-13 LAB
ALBUMIN SERPL-MCNC: 4 G/DL (ref 3.4–4.8)
ALBUMIN/GLOB SERPL: 1.3 RATIO (ref 1.1–2)
ALP SERPL-CCNC: 79 UNIT/L (ref 40–150)
ALT SERPL-CCNC: 22 UNIT/L (ref 0–55)
APPEARANCE UR: CLEAR
AST SERPL-CCNC: 21 UNIT/L (ref 5–34)
BACTERIA #/AREA URNS AUTO: ABNORMAL /HPF
BASOPHILS # BLD AUTO: 0.03 X10(3)/MCL
BASOPHILS NFR BLD AUTO: 0.6 %
BILIRUB SERPL-MCNC: 1 MG/DL
BILIRUB UR QL STRIP.AUTO: NEGATIVE
BUN SERPL-MCNC: 15.3 MG/DL (ref 9.8–20.1)
CALCIUM SERPL-MCNC: 9.5 MG/DL (ref 8.4–10.2)
CHLORIDE SERPL-SCNC: 105 MMOL/L (ref 98–107)
CHOLEST SERPL-MCNC: 224 MG/DL
CHOLEST/HDLC SERPL: 3 {RATIO} (ref 0–5)
CO2 SERPL-SCNC: 25 MMOL/L (ref 23–31)
COLOR UR AUTO: YELLOW
CREAT SERPL-MCNC: 0.97 MG/DL (ref 0.55–1.02)
DEPRECATED CALCIDIOL+CALCIFEROL SERPL-MC: 35.1 NG/ML (ref 30–80)
EOSINOPHIL # BLD AUTO: 0.07 X10(3)/MCL (ref 0–0.9)
EOSINOPHIL NFR BLD AUTO: 1.3 %
ERYTHROCYTE [DISTWIDTH] IN BLOOD BY AUTOMATED COUNT: 13.5 % (ref 11.5–17)
GFR SERPLBLD CREATININE-BSD FMLA CKD-EPI: >60 MLS/MIN/1.73/M2
GLOBULIN SER-MCNC: 3.2 GM/DL (ref 2.4–3.5)
GLUCOSE SERPL-MCNC: 93 MG/DL (ref 82–115)
GLUCOSE UR QL STRIP.AUTO: NORMAL
HCT VFR BLD AUTO: 42.8 % (ref 37–47)
HDLC SERPL-MCNC: 71 MG/DL (ref 35–60)
HGB BLD-MCNC: 13.6 G/DL (ref 12–16)
IMM GRANULOCYTES # BLD AUTO: 0.01 X10(3)/MCL (ref 0–0.04)
IMM GRANULOCYTES NFR BLD AUTO: 0.2 %
KETONES UR QL STRIP.AUTO: NEGATIVE
LDLC SERPL CALC-MCNC: 134 MG/DL (ref 50–140)
LEUKOCYTE ESTERASE UR QL STRIP.AUTO: 25
LYMPHOCYTES # BLD AUTO: 1.12 X10(3)/MCL (ref 0.6–4.6)
LYMPHOCYTES NFR BLD AUTO: 21.1 %
MCH RBC QN AUTO: 29.5 PG (ref 27–31)
MCHC RBC AUTO-ENTMCNC: 31.8 G/DL (ref 33–36)
MCV RBC AUTO: 92.8 FL (ref 80–94)
MONOCYTES # BLD AUTO: 0.39 X10(3)/MCL (ref 0.1–1.3)
MONOCYTES NFR BLD AUTO: 7.3 %
MUCOUS THREADS URNS QL MICRO: ABNORMAL /LPF
NEUTROPHILS # BLD AUTO: 3.69 X10(3)/MCL (ref 2.1–9.2)
NEUTROPHILS NFR BLD AUTO: 69.5 %
NITRITE UR QL STRIP.AUTO: NEGATIVE
NRBC BLD AUTO-RTO: 0 %
PH UR STRIP.AUTO: 5.5 [PH]
PLATELET # BLD AUTO: 317 X10(3)/MCL (ref 130–400)
PMV BLD AUTO: 9.7 FL (ref 7.4–10.4)
POTASSIUM SERPL-SCNC: 4.7 MMOL/L (ref 3.5–5.1)
PROT SERPL-MCNC: 7.2 GM/DL (ref 5.8–7.6)
PROT UR QL STRIP.AUTO: NEGATIVE
RBC # BLD AUTO: 4.61 X10(6)/MCL (ref 4.2–5.4)
RBC #/AREA URNS AUTO: ABNORMAL /HPF
RBC UR QL AUTO: ABNORMAL
SODIUM SERPL-SCNC: 139 MMOL/L (ref 136–145)
SP GR UR STRIP.AUTO: 1.02 (ref 1–1.03)
SQUAMOUS #/AREA URNS LPF: ABNORMAL /HPF
TRIGL SERPL-MCNC: 96 MG/DL (ref 37–140)
TSH SERPL-ACNC: 2.17 UIU/ML (ref 0.35–4.94)
UROBILINOGEN UR STRIP-ACNC: NORMAL
VLDLC SERPL CALC-MCNC: 19 MG/DL
WBC # SPEC AUTO: 5.31 X10(3)/MCL (ref 4.5–11.5)
WBC #/AREA URNS AUTO: ABNORMAL /HPF

## 2024-03-13 PROCEDURE — 85025 COMPLETE CBC W/AUTO DIFF WBC: CPT

## 2024-03-13 PROCEDURE — 82306 VITAMIN D 25 HYDROXY: CPT

## 2024-03-13 PROCEDURE — 81015 MICROSCOPIC EXAM OF URINE: CPT

## 2024-03-13 PROCEDURE — 80053 COMPREHEN METABOLIC PANEL: CPT

## 2024-03-13 PROCEDURE — 80061 LIPID PANEL: CPT

## 2024-03-13 PROCEDURE — 84443 ASSAY THYROID STIM HORMONE: CPT

## 2024-03-13 PROCEDURE — 36415 COLL VENOUS BLD VENIPUNCTURE: CPT

## 2024-03-18 ENCOUNTER — OFFICE VISIT (OUTPATIENT)
Dept: INTERNAL MEDICINE | Facility: CLINIC | Age: 64
End: 2024-03-18
Payer: COMMERCIAL

## 2024-03-18 VITALS
HEART RATE: 66 BPM | TEMPERATURE: 98 F | HEIGHT: 66 IN | RESPIRATION RATE: 16 BRPM | DIASTOLIC BLOOD PRESSURE: 72 MMHG | OXYGEN SATURATION: 99 % | BODY MASS INDEX: 25.07 KG/M2 | SYSTOLIC BLOOD PRESSURE: 118 MMHG | WEIGHT: 156 LBS

## 2024-03-18 DIAGNOSIS — Z12.31 VISIT FOR SCREENING MAMMOGRAM: ICD-10-CM

## 2024-03-18 DIAGNOSIS — Z00.00 WELLNESS EXAMINATION: Primary | ICD-10-CM

## 2024-03-18 DIAGNOSIS — M50.30 OTHER CERVICAL DISC DEGENERATION, UNSPECIFIED CERVICAL REGION: ICD-10-CM

## 2024-03-18 DIAGNOSIS — E55.9 VITAMIN D DEFICIENCY: ICD-10-CM

## 2024-03-18 DIAGNOSIS — G47.19 EXCESSIVE DAYTIME SLEEPINESS: ICD-10-CM

## 2024-03-18 PROCEDURE — 3078F DIAST BP <80 MM HG: CPT | Mod: CPTII,,, | Performed by: INTERNAL MEDICINE

## 2024-03-18 PROCEDURE — 3008F BODY MASS INDEX DOCD: CPT | Mod: CPTII,,, | Performed by: INTERNAL MEDICINE

## 2024-03-18 PROCEDURE — 1159F MED LIST DOCD IN RCRD: CPT | Mod: CPTII,,, | Performed by: INTERNAL MEDICINE

## 2024-03-18 PROCEDURE — 3074F SYST BP LT 130 MM HG: CPT | Mod: CPTII,,, | Performed by: INTERNAL MEDICINE

## 2024-03-18 PROCEDURE — 1160F RVW MEDS BY RX/DR IN RCRD: CPT | Mod: CPTII,,, | Performed by: INTERNAL MEDICINE

## 2024-03-18 PROCEDURE — 99396 PREV VISIT EST AGE 40-64: CPT | Mod: ,,, | Performed by: INTERNAL MEDICINE

## 2024-03-18 RX ORDER — METHOCARBAMOL 750 MG/1
500 TABLET, FILM COATED ORAL NIGHTLY
COMMUNITY
End: 2024-06-11

## 2024-03-18 RX ORDER — CYCLOBENZAPRINE HCL 10 MG
10 TABLET ORAL DAILY
COMMUNITY
Start: 2024-02-20 | End: 2024-06-11

## 2024-03-18 RX ORDER — GABAPENTIN 300 MG/1
300 CAPSULE ORAL 2 TIMES DAILY
COMMUNITY

## 2024-03-18 RX ORDER — GABAPENTIN 100 MG/1
100 CAPSULE ORAL NIGHTLY
COMMUNITY
End: 2024-06-11

## 2024-03-18 NOTE — PROGRESS NOTES
"Subjective:       Patient ID: Shira Ying is a 63 y.o. female.    Chief Complaint: Annual Exam    63 year old white female is here for follow up and wellness exam.       Review of Systems   Constitutional:  Negative for fever.   HENT:  Negative for nosebleeds.    Eyes:  Negative for visual disturbance.   Respiratory:  Negative for shortness of breath.    Cardiovascular:  Negative for chest pain.   Gastrointestinal:  Negative for abdominal pain.   Genitourinary:  Negative for dysuria.   Musculoskeletal:  Positive for neck pain. Negative for gait problem.   Neurological:  Negative for headaches.         Objective:      Physical Exam  HENT:      Head: Normocephalic.      Mouth/Throat:      Pharynx: Oropharynx is clear.   Eyes:      Extraocular Movements: Extraocular movements intact.   Cardiovascular:      Rate and Rhythm: Normal rate and regular rhythm.   Pulmonary:      Breath sounds: Normal breath sounds.   Abdominal:      Palpations: Abdomen is soft.   Musculoskeletal:         General: No swelling.   Skin:     General: Skin is warm.   Neurological:      General: No focal deficit present.      Mental Status: She is alert and oriented to person, place, and time.   Psychiatric:         Mood and Affect: Mood normal.         Vitals:    03/18/24 0929   BP: 118/72   Pulse: 66   Resp: 16   Temp: 98.1 °F (36.7 °C)   SpO2: 99%   Weight: 70.8 kg (156 lb)   Height: 5' 6" (1.676 m)      Assessment:       Problem List Items Addressed This Visit          Neuro    Other cervical disc degeneration, unspecified cervical region    Relevant Orders    CBC Auto Differential    Comprehensive Metabolic Panel    Lipid Panel    Urinalysis, Reflex to Urine Culture    TSH    Vitamin D       Endocrine    Vitamin D deficiency    Relevant Orders    CBC Auto Differential    Comprehensive Metabolic Panel    Lipid Panel    Urinalysis, Reflex to Urine Culture    TSH    Vitamin D       Other    RESOLVED: Wellness examination - Primary "    Relevant Orders    CBC Auto Differential    Comprehensive Metabolic Panel    Lipid Panel    Urinalysis, Reflex to Urine Culture    TSH    Vitamin D       Medication List with Changes/Refills   Current Medications    CYCLOBENZAPRINE (FLEXERIL) 10 MG TABLET    Take 10 mg by mouth Daily.    GABAPENTIN (NEURONTIN) 100 MG CAPSULE    Take 100 mg by mouth every evening.    GABAPENTIN (NEURONTIN) 300 MG CAPSULE    Take 300 mg by mouth 2 (two) times daily.    LACTOBACILLUS RHAMNOSUS GG (CULTURELLE) 10 BILLION CELL CAPSULE    Take 1 capsule by mouth once daily.    MAGNESIUM CITRATE 100 MG CAP    Take by mouth.    METHOCARBAMOL (ROBAXIN) 750 MG TAB    Take 500 mg by mouth every evening.    OMEGA-3 FATTY ACIDS/FISH OIL (FISH OIL-OMEGA-3 FATTY ACIDS) 300-1,000 MG CAPSULE    Take by mouth once daily.    UNABLE TO FIND    Supplement for Eye Care    VITAMIN D (VITAMIN D3) 1000 UNITS TAB    Take by mouth once daily.   Discontinued Medications    GABAPENTIN (NEURONTIN) 300 MG CAPSULE    Take 300 mg by mouth Daily.        Plan:       Chronic constipation:  Continue magnesium  Vitamin-D deficiency: Continue supplement  Degenerative disc disease cervical spine: Neck surgery with Dr. Dumas in 2024, wearing neck brace today, Dr. Denton following too, about to start PT  Excessive daytime sleepiness: Schedule home sleep study  Insomnia:  She takes gabapentin 100 mg nightly since surgery.  We decided to increase gabapentin 300 mg nightly for now  Wellness:  Calcium score of 0 in 2023. Bone density normal 3/2023. Mammogram 03/2023, refer.  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 Her  is here today as a new patient. She has 2 children from a previous marriage. They live in [place]. She enjoys hiking

## 2024-04-15 ENCOUNTER — HOSPITAL ENCOUNTER (OUTPATIENT)
Dept: RADIOLOGY | Facility: HOSPITAL | Age: 64
Discharge: HOME | End: 2024-04-15
Attending: INTERNAL MEDICINE
Payer: COMMERCIAL

## 2024-04-15 DIAGNOSIS — Z12.31 VISIT FOR SCREENING MAMMOGRAM: ICD-10-CM

## 2024-04-15 PROCEDURE — 77063 BREAST TOMOSYNTHESIS BI: CPT | Mod: TC

## 2024-04-15 PROCEDURE — 77067 SCR MAMMO BI INCL CAD: CPT | Mod: 26,,, | Performed by: RADIOLOGY

## 2024-04-15 PROCEDURE — 77063 BREAST TOMOSYNTHESIS BI: CPT | Mod: 26,,, | Performed by: RADIOLOGY

## 2024-06-05 ENCOUNTER — PATIENT MESSAGE (OUTPATIENT)
Dept: INTERNAL MEDICINE | Facility: CLINIC | Age: 64
End: 2024-06-05
Payer: COMMERCIAL

## 2024-06-11 ENCOUNTER — OFFICE VISIT (OUTPATIENT)
Dept: INTERNAL MEDICINE | Facility: CLINIC | Age: 64
End: 2024-06-11
Payer: COMMERCIAL

## 2024-06-11 VITALS
WEIGHT: 160 LBS | RESPIRATION RATE: 16 BRPM | OXYGEN SATURATION: 97 % | HEART RATE: 72 BPM | BODY MASS INDEX: 25.71 KG/M2 | DIASTOLIC BLOOD PRESSURE: 78 MMHG | HEIGHT: 66 IN | SYSTOLIC BLOOD PRESSURE: 124 MMHG | TEMPERATURE: 98 F

## 2024-06-11 DIAGNOSIS — J34.2 DEVIATED SEPTUM: Primary | ICD-10-CM

## 2024-06-11 DIAGNOSIS — R49.0 HOARSENESS: ICD-10-CM

## 2024-06-11 PROCEDURE — 99213 OFFICE O/P EST LOW 20 MIN: CPT | Mod: ,,, | Performed by: INTERNAL MEDICINE

## 2024-06-11 PROCEDURE — 3008F BODY MASS INDEX DOCD: CPT | Mod: CPTII,,, | Performed by: INTERNAL MEDICINE

## 2024-06-11 PROCEDURE — 3074F SYST BP LT 130 MM HG: CPT | Mod: CPTII,,, | Performed by: INTERNAL MEDICINE

## 2024-06-11 PROCEDURE — 3078F DIAST BP <80 MM HG: CPT | Mod: CPTII,,, | Performed by: INTERNAL MEDICINE

## 2024-06-11 PROCEDURE — 1159F MED LIST DOCD IN RCRD: CPT | Mod: CPTII,,, | Performed by: INTERNAL MEDICINE

## 2024-06-11 PROCEDURE — 1160F RVW MEDS BY RX/DR IN RCRD: CPT | Mod: CPTII,,, | Performed by: INTERNAL MEDICINE

## 2024-06-11 NOTE — PROGRESS NOTES
"Subjective:       Patient ID: Shira Ying is a 63 y.o. female.    Chief Complaint: throat tightness    63-year-old female reports hoarseness over the past 2-3 weeks.  She feels like a hair or particle is stuck in the back of her throat.  It is not associated with food.  She had neck surgery about 6 months ago.  She denies acid reflux.  She does have a history of deviated septum from an injury during college      Review of Systems   Constitutional:  Negative for fever.   Respiratory:  Negative for shortness of breath.    Cardiovascular:  Negative for chest pain.         Objective:      Physical Exam  HENT:      Head: Normocephalic.      Mouth/Throat:      Pharynx: Oropharynx is clear.   Eyes:      Extraocular Movements: Extraocular movements intact.   Abdominal:      Palpations: Abdomen is soft.   Musculoskeletal:         General: No swelling.   Skin:     General: Skin is warm.   Neurological:      General: No focal deficit present.      Mental Status: She is alert and oriented to person, place, and time.   Psychiatric:         Mood and Affect: Mood normal.         Vitals:    06/11/24 0915   BP: 124/78   Pulse: 72   Resp: 16   Temp: 97.9 °F (36.6 °C)   SpO2: 97%   Weight: 72.6 kg (160 lb)   Height: 5' 6" (1.676 m)      Assessment:       Problem List Items Addressed This Visit    None      Medication List with Changes/Refills   Current Medications    GABAPENTIN (NEURONTIN) 300 MG CAPSULE    Take 300 mg by mouth 2 (two) times daily.    LACTOBACILLUS RHAMNOSUS GG (CULTURELLE) 10 BILLION CELL CAPSULE    Take 1 capsule by mouth once daily.    MAGNESIUM CITRATE 100 MG CAP    Take by mouth.    OMEGA-3 FATTY ACIDS/FISH OIL (FISH OIL-OMEGA-3 FATTY ACIDS) 300-1,000 MG CAPSULE    Take by mouth once daily.    UNABLE TO FIND    Supplement for Eye Care    VITAMIN D (VITAMIN D3) 1000 UNITS TAB    Take by mouth once daily.   Discontinued Medications    CYCLOBENZAPRINE (FLEXERIL) 10 MG TABLET    Take 10 mg by mouth " Daily.    GABAPENTIN (NEURONTIN) 100 MG CAPSULE    Take 100 mg by mouth every evening.    METHOCARBAMOL (ROBAXIN) 750 MG TAB    Take 500 mg by mouth every evening.        Plan:       Hoarseness   Deviated septum     Refer to ENT.

## 2024-06-13 ENCOUNTER — PATIENT MESSAGE (OUTPATIENT)
Dept: INTERNAL MEDICINE | Facility: CLINIC | Age: 64
End: 2024-06-13
Payer: COMMERCIAL

## 2024-07-14 ENCOUNTER — OFFICE VISIT (OUTPATIENT)
Dept: URGENT CARE | Facility: CLINIC | Age: 64
End: 2024-07-14
Payer: COMMERCIAL

## 2024-07-14 VITALS
RESPIRATION RATE: 18 BRPM | DIASTOLIC BLOOD PRESSURE: 90 MMHG | HEART RATE: 70 BPM | TEMPERATURE: 98 F | SYSTOLIC BLOOD PRESSURE: 149 MMHG | BODY MASS INDEX: 25.11 KG/M2 | HEIGHT: 67 IN | WEIGHT: 160 LBS | OXYGEN SATURATION: 98 %

## 2024-07-14 DIAGNOSIS — U07.1 COVID-19: Primary | ICD-10-CM

## 2024-07-14 DIAGNOSIS — J02.9 SORE THROAT: ICD-10-CM

## 2024-07-14 DIAGNOSIS — R05.9 COUGH, UNSPECIFIED TYPE: ICD-10-CM

## 2024-07-14 LAB
CTP QC/QA: YES
CTP QC/QA: YES
MOLECULAR STREP A: NEGATIVE
SARS-COV-2 RDRP RESP QL NAA+PROBE: POSITIVE

## 2024-07-14 PROCEDURE — 87635 SARS-COV-2 COVID-19 AMP PRB: CPT | Mod: QW,,, | Performed by: NURSE PRACTITIONER

## 2024-07-14 PROCEDURE — 99213 OFFICE O/P EST LOW 20 MIN: CPT | Mod: ,,, | Performed by: NURSE PRACTITIONER

## 2024-07-14 PROCEDURE — 87651 STREP A DNA AMP PROBE: CPT | Mod: QW,,, | Performed by: NURSE PRACTITIONER

## 2024-07-14 RX ORDER — AZITHROMYCIN 250 MG/1
TABLET, FILM COATED ORAL
Qty: 6 TABLET | Refills: 0 | Status: SHIPPED | OUTPATIENT
Start: 2024-07-14 | End: 2024-07-19

## 2024-07-14 RX ORDER — OMEPRAZOLE 40 MG/1
40 CAPSULE, DELAYED RELEASE ORAL DAILY
COMMUNITY

## 2024-07-14 RX ORDER — PROMETHAZINE HYDROCHLORIDE AND DEXTROMETHORPHAN HYDROBROMIDE 6.25; 15 MG/5ML; MG/5ML
7.5 SYRUP ORAL EVERY 6 HOURS PRN
Qty: 120 ML | Refills: 0 | Status: SHIPPED | OUTPATIENT
Start: 2024-07-14 | End: 2024-07-24

## 2024-07-14 RX ORDER — TRAMADOL HYDROCHLORIDE 50 MG/1
50 TABLET ORAL EVERY 12 HOURS PRN
Qty: 6 TABLET | Refills: 0 | Status: SHIPPED | OUTPATIENT
Start: 2024-07-14 | End: 2024-07-17

## 2024-07-14 NOTE — PROGRESS NOTES
"Subjective:      Patient ID: Shira Ying is a 64 y.o. female.    Vitals:  height is 5' 7" (1.702 m) and weight is 72.6 kg (160 lb). Her temperature is 98.4 °F (36.9 °C). Her blood pressure is 149/90 (abnormal) and her pulse is 70. Her respiration is 18 and oxygen saturation is 98%.     Chief Complaint: Sore Throat     Patient is a 64 y.o. female who presents to urgent care with complaints of sore throat, headache, fever(100), cough, x5 days . Alleviating factors include Tylenol, Motrin with no relief. Patient denies congestion,n/v/d.      ROS   Objective:     Physical Exam   Constitutional: She is oriented to person, place, and time. She appears well-developed. She is cooperative.  Non-toxic appearance. She does not appear ill. No distress.   HENT:   Head: Normocephalic and atraumatic.   Ears:   Right Ear: Hearing, tympanic membrane, external ear and ear canal normal.   Left Ear: Hearing, tympanic membrane, external ear and ear canal normal.   Nose: Nose normal. No mucosal edema, rhinorrhea or nasal deformity. No epistaxis. Right sinus exhibits no maxillary sinus tenderness and no frontal sinus tenderness. Left sinus exhibits no maxillary sinus tenderness and no frontal sinus tenderness.   Mouth/Throat: Uvula is midline, oropharynx is clear and moist and mucous membranes are normal. No trismus in the jaw. Normal dentition. No uvula swelling. No oropharyngeal exudate, posterior oropharyngeal edema or posterior oropharyngeal erythema.   Eyes: Conjunctivae and lids are normal. No scleral icterus.   Neck: Trachea normal and phonation normal. Neck supple. No edema present. No erythema present. No neck rigidity present.   Cardiovascular: Normal rate, regular rhythm, normal heart sounds and normal pulses.   Pulmonary/Chest: Effort normal and breath sounds normal. No respiratory distress. She has no decreased breath sounds. She has no rhonchi.   Abdominal: Normal appearance.   Musculoskeletal: Normal range of " motion.         General: No deformity. Normal range of motion.   Neurological: She is alert and oriented to person, place, and time. She exhibits normal muscle tone. Coordination normal.   Skin: Skin is warm, dry, intact, not diaphoretic and not pale.   Psychiatric: Her speech is normal and behavior is normal. Judgment and thought content normal.   Nursing note and vitals reviewed.       Previous History      Review of patient's allergies indicates:  No Known Allergies    Past Medical History:   Diagnosis Date    Carcinoma in situ of skin of right upper limb, including shoulder     Central serous chorioretinopathy, bilateral     GERD (gastroesophageal reflux disease)     Neuralgia and neuritis, unspecified     Other cervical disc degeneration, unspecified cervical region     Paresthesia of left leg     Segmental and somatic dysfunction of cervical region     Segmental and somatic dysfunction of lumbar region     Vertebrogenic low back pain      Current Outpatient Medications   Medication Instructions    azithromycin (Z-CONSTANTINE) 250 MG tablet Take 2 tablets by mouth on day 1; Take 1 tablet by mouth on days 2-5    gabapentin (NEURONTIN) 300 mg, Oral, 2 times daily    Lactobacillus rhamnosus GG (CULTURELLE) 10 billion cell capsule 1 capsule, Oral, Daily    magnesium citrate 100 mg Cap Oral    omega-3 fatty acids/fish oil (FISH OIL-OMEGA-3 FATTY ACIDS) 300-1,000 mg capsule Oral, Daily    omeprazole (PRILOSEC) 40 mg, Oral, Daily    promethazine-dextromethorphan (PROMETHAZINE-DM) 6.25-15 mg/5 mL Syrp 7.5 mLs, Oral, Every 6 hours PRN    traMADoL (ULTRAM) 50 mg, Oral, Every 12 hours PRN    UNABLE TO FIND Supplement for Eye Care    vitamin D (VITAMIN D3) 1000 units Tab Oral, Daily     Past Surgical History:   Procedure Laterality Date    ANTERIOR CERVICAL DISCECTOMY W/ FUSION      APPENDECTOMY      CERVICAL FUSION      FISSURECTOMY      FOOT SURGERY      LAPAROSCOPIC APPENDECTOMY N/A 06/14/2023    Procedure: APPENDECTOMY,  "LAPAROSCOPIC;  Surgeon: Tommy Mesa MD;  Location: [facility];  Service: General;  Laterality: N/A;    OOPHORECTOMY           Social History     Tobacco Use    Smoking status: Former     Types: Cigarettes   Substance Use Topics    Alcohol use: Yes     Comment: occasional    Drug use: Never        Physical Exam      Vital Signs Reviewed   BP (!) 149/90   Pulse 70   Temp 98.4 °F (36.9 °C)   Resp 18   Ht 5' 7" (1.702 m)   Wt 72.6 kg (160 lb)   SpO2 98%   BMI 25.06 kg/m²        Procedures    Procedures     Labs     Results for orders placed or performed in visit on 07/14/24   POCT COVID-19 Rapid Screening   Result Value Ref Range    POC Rapid COVID Positive (A) Negative     Acceptable Yes    POCT Strep A, Molecular   Result Value Ref Range    Molecular Strep A, POC Negative (A) Negative     Acceptable Yes         Assessment:     1. COVID-19    2. Cough, unspecified type    3. Sore throat        Plan:       COVID-19    Cough, unspecified type  -     POCT COVID-19 Rapid Screening    Sore throat  -     POCT COVID-19 Rapid Screening  -     POCT Strep A, Molecular    Other orders  -     azithromycin (Z-CONSTANTINE) 250 MG tablet; Take 2 tablets by mouth on day 1; Take 1 tablet by mouth on days 2-5  Dispense: 6 tablet; Refill: 0  -     promethazine-dextromethorphan (PROMETHAZINE-DM) 6.25-15 mg/5 mL Syrp; Take 7.5 mLs by mouth every 6 (six) hours as needed (As needed for cough).  Dispense: 120 mL; Refill: 0  -     traMADoL (ULTRAM) 50 mg tablet; Take 1 tablet (50 mg total) by mouth every 12 (twelve) hours as needed for Pain.  Dispense: 6 tablet; Refill: 0                    "

## 2024-07-14 NOTE — PATIENT INSTRUCTIONS
Medications sent to pharmacy begin taking antibiotic with food today and take as directed   Cough syrup as needed for cough and help with rest do not drink or drive while taking this medication   Tramadol also sent to pharmacy use this for any breakthrough pain after taking Tylenol caution with this medication and we will cause some sedation effects do not also drink or drive while taking this medication   Continue using over-the-counter medications such as antihistamines and Tylenol for pain in and throat irritation drink plenty of fluids   Honey would also help with the sore throat symptoms       COVID-Take Tylenol (acetaminophen) for fever/aches.  Drink plenty of fluids.     Take OTC Decongestants  (Claritin D/sudafed OR Coricidin for people with HTN) to cut down on the fluid in the lining of your nose and relieve swollen nasal passages and congestion. May also use cough/cold/congestion medication such as Dayquil/Nyquil and/or antihistamine such as Claritin/Zyrtec/Allegra.  Cepacol sore throat lozenges/spray if needed.     Drink plenty of fluids.  Rest.     Go directly to ER if you experience any SOB, chest pain, or other worrisome symptoms.      If positive for Covid-19, you should stay in isolation until: At least 24 hours have passed since recovery defined as resolution of fever without the use of fever-reducing medications and improvement in symptoms

## 2024-08-29 ENCOUNTER — DOCUMENTATION ONLY (OUTPATIENT)
Dept: INTERNAL MEDICINE | Facility: CLINIC | Age: 64
End: 2024-08-29
Payer: COMMERCIAL

## 2024-09-26 ENCOUNTER — DOCUMENTATION ONLY (OUTPATIENT)
Dept: INTERNAL MEDICINE | Facility: CLINIC | Age: 64
End: 2024-09-26
Payer: COMMERCIAL

## 2024-09-26 LAB
PAP RECOMMENDATION EXT: NORMAL
PAP SMEAR: NORMAL

## 2024-10-18 ENCOUNTER — PATIENT MESSAGE (OUTPATIENT)
Dept: INTERNAL MEDICINE | Facility: CLINIC | Age: 64
End: 2024-10-18
Payer: COMMERCIAL

## 2024-10-18 ENCOUNTER — LAB VISIT (OUTPATIENT)
Dept: LAB | Facility: HOSPITAL | Age: 64
End: 2024-10-18
Attending: OBSTETRICS & GYNECOLOGY
Payer: COMMERCIAL

## 2024-10-18 DIAGNOSIS — R63.5 ABNORMAL WEIGHT GAIN: ICD-10-CM

## 2024-10-18 DIAGNOSIS — E34.9 ENDOCRINE DISORDER RELATED TO PUBERTY: Primary | ICD-10-CM

## 2024-10-18 LAB
ESTRADIOL SERPL HS-MCNC: <24 PG/ML
FSH SERPL-ACNC: 73.9 MIU/ML
PROGEST SERPL-MCNC: <0.5 NG/ML
TESTOST SERPL-MCNC: 28.9 NG/DL (ref 12.4–35.75)

## 2024-10-18 PROCEDURE — 84403 ASSAY OF TOTAL TESTOSTERONE: CPT

## 2024-10-18 PROCEDURE — 83001 ASSAY OF GONADOTROPIN (FSH): CPT

## 2024-10-18 PROCEDURE — 82670 ASSAY OF TOTAL ESTRADIOL: CPT

## 2024-10-18 PROCEDURE — 36415 COLL VENOUS BLD VENIPUNCTURE: CPT

## 2024-10-18 PROCEDURE — 84144 ASSAY OF PROGESTERONE: CPT

## 2025-01-27 ENCOUNTER — PATIENT MESSAGE (OUTPATIENT)
Dept: INTERNAL MEDICINE | Facility: CLINIC | Age: 65
End: 2025-01-27
Payer: COMMERCIAL

## 2025-03-10 ENCOUNTER — LAB VISIT (OUTPATIENT)
Dept: LAB | Facility: HOSPITAL | Age: 65
End: 2025-03-10
Attending: INTERNAL MEDICINE
Payer: COMMERCIAL

## 2025-03-10 DIAGNOSIS — E55.9 VITAMIN D DEFICIENCY: ICD-10-CM

## 2025-03-10 DIAGNOSIS — M50.30 OTHER CERVICAL DISC DEGENERATION, UNSPECIFIED CERVICAL REGION: ICD-10-CM

## 2025-03-10 DIAGNOSIS — Z00.00 WELLNESS EXAMINATION: ICD-10-CM

## 2025-03-10 LAB
25(OH)D3+25(OH)D2 SERPL-MCNC: 43 NG/ML (ref 30–80)
ALBUMIN SERPL-MCNC: 3.7 G/DL (ref 3.4–4.8)
ALBUMIN/GLOB SERPL: 1.1 RATIO (ref 1.1–2)
ALP SERPL-CCNC: 60 UNIT/L (ref 40–150)
ALT SERPL-CCNC: 25 UNIT/L (ref 0–55)
ANION GAP SERPL CALC-SCNC: 5 MEQ/L
AST SERPL-CCNC: 23 UNIT/L (ref 5–34)
BACTERIA #/AREA URNS AUTO: ABNORMAL /HPF
BASOPHILS # BLD AUTO: 0.03 X10(3)/MCL
BASOPHILS NFR BLD AUTO: 0.6 %
BILIRUB SERPL-MCNC: 0.9 MG/DL
BILIRUB UR QL STRIP.AUTO: NEGATIVE
BUN SERPL-MCNC: 18.9 MG/DL (ref 9.8–20.1)
CALCIUM SERPL-MCNC: 9.2 MG/DL (ref 8.4–10.2)
CHLORIDE SERPL-SCNC: 109 MMOL/L (ref 98–107)
CHOLEST SERPL-MCNC: 211 MG/DL
CHOLEST/HDLC SERPL: 3 {RATIO} (ref 0–5)
CLARITY UR: CLEAR
CO2 SERPL-SCNC: 24 MMOL/L (ref 23–31)
COLOR UR AUTO: ABNORMAL
CREAT SERPL-MCNC: 0.91 MG/DL (ref 0.55–1.02)
CREAT/UREA NIT SERPL: 21
EOSINOPHIL # BLD AUTO: 0.11 X10(3)/MCL (ref 0–0.9)
EOSINOPHIL NFR BLD AUTO: 2.1 %
ERYTHROCYTE [DISTWIDTH] IN BLOOD BY AUTOMATED COUNT: 13.4 % (ref 11.5–17)
GFR SERPLBLD CREATININE-BSD FMLA CKD-EPI: >60 ML/MIN/1.73/M2
GLOBULIN SER-MCNC: 3.3 GM/DL (ref 2.4–3.5)
GLUCOSE SERPL-MCNC: 92 MG/DL (ref 82–115)
GLUCOSE UR QL STRIP: NORMAL
HCT VFR BLD AUTO: 38.6 % (ref 37–47)
HDLC SERPL-MCNC: 65 MG/DL (ref 35–60)
HGB BLD-MCNC: 12.4 G/DL (ref 12–16)
HGB UR QL STRIP: ABNORMAL
HYALINE CASTS #/AREA URNS LPF: ABNORMAL /LPF
IMM GRANULOCYTES # BLD AUTO: 0.02 X10(3)/MCL (ref 0–0.04)
IMM GRANULOCYTES NFR BLD AUTO: 0.4 %
KETONES UR QL STRIP: NEGATIVE
LDLC SERPL CALC-MCNC: 128 MG/DL (ref 50–140)
LEUKOCYTE ESTERASE UR QL STRIP: 250
LYMPHOCYTES # BLD AUTO: 1.6 X10(3)/MCL (ref 0.6–4.6)
LYMPHOCYTES NFR BLD AUTO: 30 %
MCH RBC QN AUTO: 30 PG (ref 27–31)
MCHC RBC AUTO-ENTMCNC: 32.1 G/DL (ref 33–36)
MCV RBC AUTO: 93.5 FL (ref 80–94)
MONOCYTES # BLD AUTO: 0.48 X10(3)/MCL (ref 0.1–1.3)
MONOCYTES NFR BLD AUTO: 9 %
NEUTROPHILS # BLD AUTO: 3.1 X10(3)/MCL (ref 2.1–9.2)
NEUTROPHILS NFR BLD AUTO: 57.9 %
NITRITE UR QL STRIP: NEGATIVE
NRBC BLD AUTO-RTO: 0 %
PH UR STRIP: 5 [PH]
PLATELET # BLD AUTO: 250 X10(3)/MCL (ref 130–400)
PMV BLD AUTO: 10.4 FL (ref 7.4–10.4)
POTASSIUM SERPL-SCNC: 4.1 MMOL/L (ref 3.5–5.1)
PROT SERPL-MCNC: 7 GM/DL (ref 5.8–7.6)
PROT UR QL STRIP: NEGATIVE
RBC # BLD AUTO: 4.13 X10(6)/MCL (ref 4.2–5.4)
RBC #/AREA URNS AUTO: ABNORMAL /HPF
SODIUM SERPL-SCNC: 138 MMOL/L (ref 136–145)
SP GR UR STRIP.AUTO: 1.02 (ref 1–1.03)
SQUAMOUS #/AREA URNS LPF: ABNORMAL /HPF
TRIGL SERPL-MCNC: 89 MG/DL (ref 37–140)
TSH SERPL-ACNC: 4.66 UIU/ML (ref 0.35–4.94)
UROBILINOGEN UR STRIP-ACNC: NORMAL
VLDLC SERPL CALC-MCNC: 18 MG/DL
WBC # BLD AUTO: 5.34 X10(3)/MCL (ref 4.5–11.5)
WBC #/AREA URNS AUTO: ABNORMAL /HPF

## 2025-03-10 PROCEDURE — 84443 ASSAY THYROID STIM HORMONE: CPT

## 2025-03-10 PROCEDURE — 85025 COMPLETE CBC W/AUTO DIFF WBC: CPT

## 2025-03-10 PROCEDURE — 36415 COLL VENOUS BLD VENIPUNCTURE: CPT

## 2025-03-10 PROCEDURE — 81015 MICROSCOPIC EXAM OF URINE: CPT

## 2025-03-10 PROCEDURE — 82306 VITAMIN D 25 HYDROXY: CPT

## 2025-03-10 PROCEDURE — 80061 LIPID PANEL: CPT

## 2025-03-10 PROCEDURE — 80053 COMPREHEN METABOLIC PANEL: CPT

## 2025-03-11 ENCOUNTER — PATIENT MESSAGE (OUTPATIENT)
Dept: INTERNAL MEDICINE | Facility: CLINIC | Age: 65
End: 2025-03-11
Payer: COMMERCIAL

## 2025-03-12 NOTE — TELEPHONE ENCOUNTER
Nothing concerning with her labs.  Good cholesterol is high.  She had some white blood cells in her urine, but no bacteria.  Dipstick urine test showed some blood, but microscopy had no blood.  Overall, things looked okay for her

## 2025-03-17 ENCOUNTER — TELEPHONE (OUTPATIENT)
Dept: INTERNAL MEDICINE | Facility: CLINIC | Age: 65
End: 2025-03-17
Payer: COMMERCIAL

## 2025-03-31 ENCOUNTER — TELEPHONE (OUTPATIENT)
Dept: INTERNAL MEDICINE | Facility: CLINIC | Age: 65
End: 2025-03-31
Payer: COMMERCIAL

## 2025-04-07 ENCOUNTER — OFFICE VISIT (OUTPATIENT)
Dept: INTERNAL MEDICINE | Facility: CLINIC | Age: 65
End: 2025-04-07
Payer: COMMERCIAL

## 2025-04-07 ENCOUNTER — TELEPHONE (OUTPATIENT)
Dept: RESEARCH | Facility: HOSPITAL | Age: 65
End: 2025-04-07
Payer: COMMERCIAL

## 2025-04-07 VITALS
RESPIRATION RATE: 16 BRPM | TEMPERATURE: 97 F | WEIGHT: 168 LBS | HEART RATE: 73 BPM | HEIGHT: 67 IN | SYSTOLIC BLOOD PRESSURE: 118 MMHG | OXYGEN SATURATION: 96 % | DIASTOLIC BLOOD PRESSURE: 82 MMHG | BODY MASS INDEX: 26.37 KG/M2

## 2025-04-07 DIAGNOSIS — E55.9 VITAMIN D DEFICIENCY: ICD-10-CM

## 2025-04-07 DIAGNOSIS — Z00.00 WELLNESS EXAMINATION: Primary | ICD-10-CM

## 2025-04-07 DIAGNOSIS — M50.30 OTHER CERVICAL DISC DEGENERATION, UNSPECIFIED CERVICAL REGION: ICD-10-CM

## 2025-04-07 PROCEDURE — 3008F BODY MASS INDEX DOCD: CPT | Mod: CPTII,,, | Performed by: INTERNAL MEDICINE

## 2025-04-07 PROCEDURE — 1160F RVW MEDS BY RX/DR IN RCRD: CPT | Mod: CPTII,,, | Performed by: INTERNAL MEDICINE

## 2025-04-07 PROCEDURE — 3074F SYST BP LT 130 MM HG: CPT | Mod: CPTII,,, | Performed by: INTERNAL MEDICINE

## 2025-04-07 PROCEDURE — 1159F MED LIST DOCD IN RCRD: CPT | Mod: CPTII,,, | Performed by: INTERNAL MEDICINE

## 2025-04-07 PROCEDURE — 99396 PREV VISIT EST AGE 40-64: CPT | Mod: ,,, | Performed by: INTERNAL MEDICINE

## 2025-04-07 PROCEDURE — 3079F DIAST BP 80-89 MM HG: CPT | Mod: CPTII,,, | Performed by: INTERNAL MEDICINE

## 2025-04-07 NOTE — PROGRESS NOTES
"Subjective:       Patient ID: Shira Ying is a 64 y.o. female.    Chief Complaint: Annual Exam    64 year old white female is here for follow up and wellness exam.       Review of Systems   Constitutional:  Negative for fever.   HENT:  Negative for nosebleeds.    Eyes:  Negative for visual disturbance.   Respiratory:  Negative for shortness of breath.    Cardiovascular:  Negative for chest pain.   Gastrointestinal:  Negative for abdominal pain.   Genitourinary:  Negative for dysuria.   Musculoskeletal:  Positive for neck pain. Negative for gait problem.   Neurological:  Negative for headaches.         Objective:      Physical Exam  HENT:      Head: Normocephalic.      Mouth/Throat:      Pharynx: Oropharynx is clear.   Eyes:      Extraocular Movements: Extraocular movements intact.   Cardiovascular:      Rate and Rhythm: Normal rate and regular rhythm.   Pulmonary:      Breath sounds: Normal breath sounds.   Abdominal:      Palpations: Abdomen is soft.   Musculoskeletal:         General: No swelling.   Skin:     General: Skin is warm.   Neurological:      General: No focal deficit present.      Mental Status: She is alert and oriented to person, place, and time.   Psychiatric:         Mood and Affect: Mood normal.         Vitals:    04/07/25 1024   BP: 118/82   BP Location: Left arm   Patient Position: Sitting   Pulse: 73   Resp: 16   Temp: 96.8 °F (36 °C)   TempSrc: Temporal   SpO2: 96%   Weight: 76.2 kg (168 lb)   Height: 5' 7" (1.702 m)        Assessment:       Problem List Items Addressed This Visit       RESOLVED: Wellness examination - Primary    Relevant Orders    CBC Auto Differential    Comprehensive Metabolic Panel    Lipid Panel    Urinalysis, Reflex to Urine Culture    TSH    Vitamin D    Vitamin D deficiency    Relevant Orders    CBC Auto Differential    Comprehensive Metabolic Panel    Lipid Panel    Urinalysis, Reflex to Urine Culture    TSH    Vitamin D    Other cervical disc " degeneration, unspecified cervical region    Relevant Orders    CBC Auto Differential    Comprehensive Metabolic Panel    Lipid Panel    Urinalysis, Reflex to Urine Culture    TSH    Vitamin D       Medication List with Changes/Refills   Current Medications    GABAPENTIN (NEURONTIN) 300 MG CAPSULE    Take 300 mg by mouth as needed.    LACTOBACILLUS RHAMNOSUS GG (CULTURELLE) 10 BILLION CELL CAPSULE    Take 1 capsule by mouth once daily.    MAGNESIUM CITRATE 100 MG CAP    Take by mouth.    OMEGA-3 FATTY ACIDS/FISH OIL (FISH OIL-OMEGA-3 FATTY ACIDS) 300-1,000 MG CAPSULE    Take by mouth once daily.    OMEPRAZOLE (PRILOSEC) 40 MG CAPSULE    Take 20 mg by mouth once daily.    UNABLE TO FIND    Supplement for Eye Care    VITAMIN D (VITAMIN D3) 1000 UNITS TAB    Take by mouth once daily.        Plan:       Chronic constipation: try Miralax  Vitamin-D deficiency: Continue supplement  Degenerative disc disease cervical spine: Neck surgery with Dr. Dumas in 2024, Dr. Denton following too  Mild sleep apnea: Diagnosed in 2024, compliant with device and benefiting from it with less daytime fatigue  Insomnia:  She takes gabapentin 300 mg nightly  GERD: Followed by ENT, Dr. Lee, who started PPI (causing some constipation)  Wellness:  Calcium score of 0 in 2023. Bone density normal 3/2023. Mammogram 4/2024 (Dr. Mar following).  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 She has 2 children from a previous marriage. They live in [place]. She enjoys hiking

## 2025-04-07 NOTE — TELEPHONE ENCOUNTER
Study title: [employer]ANK [employer]IVAN  IRB #: [id number]      I called subject back concerning her insurance Patient called to discuss participation into the FRANK SULLIVAN study. Explained overview of study. Patient expressed interest and is willing to see us in July when she gets on Medicare her self. Patient voiced understanding and will come in on her next visit.   Reminder [email or web address] message will be sent about appointment.

## 2025-04-17 ENCOUNTER — HOSPITAL ENCOUNTER (OUTPATIENT)
Dept: RADIOLOGY | Facility: HOSPITAL | Age: 65
Discharge: HOME | End: 2025-04-17
Attending: OBSTETRICS & GYNECOLOGY
Payer: COMMERCIAL

## 2025-04-17 DIAGNOSIS — Z12.31 ENCOUNTER FOR SCREENING MAMMOGRAM FOR BREAST CANCER: ICD-10-CM

## 2025-04-17 PROCEDURE — 77063 BREAST TOMOSYNTHESIS BI: CPT | Mod: TC

## 2025-04-25 ENCOUNTER — PATIENT MESSAGE (OUTPATIENT)
Dept: INTERNAL MEDICINE | Facility: CLINIC | Age: 65
End: 2025-04-25
Payer: COMMERCIAL

## 2025-04-29 ENCOUNTER — PATIENT MESSAGE (OUTPATIENT)
Dept: INTERNAL MEDICINE | Facility: CLINIC | Age: 65
End: 2025-04-29
Payer: COMMERCIAL

## 2025-04-30 ENCOUNTER — RESULTS FOLLOW-UP (OUTPATIENT)
Dept: INTERNAL MEDICINE | Facility: CLINIC | Age: 65
End: 2025-04-30

## 2025-05-15 ENCOUNTER — PATIENT MESSAGE (OUTPATIENT)
Dept: INTERNAL MEDICINE | Facility: CLINIC | Age: 65
End: 2025-05-15
Payer: COMMERCIAL

## 2025-05-15 DIAGNOSIS — Z00.00 WELLNESS EXAMINATION: Primary | ICD-10-CM

## 2025-05-15 RX ORDER — TRAZODONE HYDROCHLORIDE 50 MG/1
50 TABLET ORAL NIGHTLY
Qty: 30 TABLET | Refills: 5 | Status: SHIPPED | OUTPATIENT
Start: 2025-05-15 | End: 2026-05-15

## 2025-08-28 ENCOUNTER — PATIENT MESSAGE (OUTPATIENT)
Dept: INTERNAL MEDICINE | Facility: CLINIC | Age: 65
End: 2025-08-28
Payer: MEDICARE

## 2025-08-29 ENCOUNTER — OFFICE VISIT (OUTPATIENT)
Dept: INTERNAL MEDICINE | Facility: CLINIC | Age: 65
End: 2025-08-29
Payer: MEDICARE

## 2025-08-29 ENCOUNTER — TELEPHONE (OUTPATIENT)
Dept: RESEARCH | Facility: HOSPITAL | Age: 65
End: 2025-08-29
Payer: MEDICARE

## 2025-08-29 ENCOUNTER — TELEPHONE (OUTPATIENT)
Dept: RESEARCH | Facility: HOSPITAL | Age: 65
End: 2025-08-29

## 2025-08-29 DIAGNOSIS — F51.01 PRIMARY INSOMNIA: ICD-10-CM

## 2025-08-29 DIAGNOSIS — Z00.00 WELLNESS EXAMINATION: Primary | ICD-10-CM

## 2025-08-29 DIAGNOSIS — M50.30 OTHER CERVICAL DISC DEGENERATION, UNSPECIFIED CERVICAL REGION: ICD-10-CM

## 2025-08-29 DIAGNOSIS — E78.5 DYSLIPIDEMIA: ICD-10-CM

## 2025-08-29 DIAGNOSIS — E55.9 VITAMIN D DEFICIENCY: ICD-10-CM

## 2025-08-29 RX ORDER — TEMAZEPAM 7.5 MG/1
7.5 CAPSULE ORAL NIGHTLY
Qty: 30 CAPSULE | Refills: 5 | Status: SHIPPED | OUTPATIENT
Start: 2025-08-29 | End: 2026-02-25

## 2025-09-03 ENCOUNTER — TELEPHONE (OUTPATIENT)
Dept: INTERNAL MEDICINE | Facility: CLINIC | Age: 65
End: 2025-09-03
Payer: MEDICARE

## (undated) DEVICE — STAPLER ECHELON FLEX GST 45MM

## (undated) DEVICE — SHEARS HARMONIC CRVD TIP 36CM

## (undated) DEVICE — BLADE SURG STAINLESS STEEL #11

## (undated) DEVICE — GLOVE PROTEXIS HYDROGEL SZ7.5

## (undated) DEVICE — CLOSURE SKIN STERI STRIP 1/2X4

## (undated) DEVICE — RELOAD ECHELON ENDOPATH 45MM

## (undated) DEVICE — GLOVE PROTEXIS BLUE LATEX 7.5

## (undated) DEVICE — APPLICATOR STRL COT 2INNR 6IN

## (undated) DEVICE — NDL SAFETY 22G X 1.5 ECLIPSE

## (undated) DEVICE — SUT VICRYL+ 27 UR-6 VIOL

## (undated) DEVICE — TRAY CATH FOL SIL URIMTR 16FR

## (undated) DEVICE — IRRIGATOR SUCTION W/TIP

## (undated) DEVICE — KIT GEN LAPAROSCOPY LAFAYETTE

## (undated) DEVICE — CANNULA ENDOPATH XCEL 5X100MM

## (undated) DEVICE — NDL INSUF ULTRA VERESS 120MM

## (undated) DEVICE — DRAPE SLUSH WARMER 66X44IN

## (undated) DEVICE — TROCAR ENDOPATH XCEL 5X100MM

## (undated) DEVICE — BAG SPEC RETRV ENDO 4X6IN DISP

## (undated) DEVICE — SUT VICRYL PLUS 4-0 FS-2 27IN

## (undated) DEVICE — SOL IRRI STRL WATER 1000ML

## (undated) DEVICE — ADHESIVE MASTISOL VIAL 48/BX

## (undated) DEVICE — TROCAR ENDOPATH XCEL 12MM 10CM

## (undated) DEVICE — ELECTRODE PATIENT RETURN DISP

## (undated) DEVICE — SYR 10CC LUER LOCK

## (undated) DEVICE — KIT SURGICAL TURNOVER